# Patient Record
Sex: FEMALE | Race: WHITE | Employment: FULL TIME | URBAN - METROPOLITAN AREA
[De-identification: names, ages, dates, MRNs, and addresses within clinical notes are randomized per-mention and may not be internally consistent; named-entity substitution may affect disease eponyms.]

---

## 2017-08-11 PROBLEM — G43.019 INTRACTABLE MIGRAINE WITHOUT AURA AND WITHOUT STATUS MIGRAINOSUS: Status: ACTIVE | Noted: 2017-08-11

## 2018-11-14 ENCOUNTER — APPOINTMENT (OUTPATIENT)
Dept: ULTRASOUND IMAGING | Age: 28
End: 2018-11-14
Attending: EMERGENCY MEDICINE
Payer: MEDICARE

## 2018-11-14 ENCOUNTER — HOSPITAL ENCOUNTER (EMERGENCY)
Age: 28
Discharge: HOME | End: 2018-11-14
Attending: EMERGENCY MEDICINE
Payer: MEDICARE

## 2018-11-14 ENCOUNTER — APPOINTMENT (OUTPATIENT)
Dept: GENERAL RADIOLOGY | Age: 28
End: 2018-11-14
Attending: EMERGENCY MEDICINE
Payer: MEDICARE

## 2018-11-14 VITALS
BODY MASS INDEX: 25.61 KG/M2 | WEIGHT: 150 LBS | OXYGEN SATURATION: 99 % | SYSTOLIC BLOOD PRESSURE: 116 MMHG | HEART RATE: 82 BPM | TEMPERATURE: 98.4 F | DIASTOLIC BLOOD PRESSURE: 82 MMHG | HEIGHT: 64 IN | RESPIRATION RATE: 16 BRPM

## 2018-11-14 DIAGNOSIS — R10.31 ABDOMINAL PAIN, RIGHT LOWER QUADRANT: Primary | ICD-10-CM

## 2018-11-14 DIAGNOSIS — N83.201 CYST OF RIGHT OVARY: ICD-10-CM

## 2018-11-14 LAB
ALBUMIN SERPL-MCNC: 3.8 G/DL (ref 3.5–5)
ALBUMIN/GLOB SERPL: 1.1 {RATIO} (ref 1.2–3.5)
ALP SERPL-CCNC: 64 U/L (ref 50–136)
ALT SERPL-CCNC: 24 U/L (ref 12–65)
ANION GAP SERPL CALC-SCNC: 7 MMOL/L (ref 7–16)
AST SERPL-CCNC: 12 U/L (ref 15–37)
BASOPHILS # BLD: 0.1 K/UL (ref 0–0.2)
BASOPHILS NFR BLD: 1 % (ref 0–2)
BILIRUB SERPL-MCNC: 0.6 MG/DL (ref 0.2–1.1)
BUN SERPL-MCNC: 7 MG/DL (ref 6–23)
CALCIUM SERPL-MCNC: 8.5 MG/DL (ref 8.3–10.4)
CHLORIDE SERPL-SCNC: 110 MMOL/L (ref 98–107)
CO2 SERPL-SCNC: 24 MMOL/L (ref 21–32)
CREAT SERPL-MCNC: 0.87 MG/DL (ref 0.6–1)
DIFFERENTIAL METHOD BLD: NORMAL
EOSINOPHIL # BLD: 0.1 K/UL (ref 0–0.8)
EOSINOPHIL NFR BLD: 1 % (ref 0.5–7.8)
ERYTHROCYTE [DISTWIDTH] IN BLOOD BY AUTOMATED COUNT: 12.3 %
GLOBULIN SER CALC-MCNC: 3.5 G/DL (ref 2.3–3.5)
GLUCOSE SERPL-MCNC: 96 MG/DL (ref 65–100)
HCG UR QL: NEGATIVE
HCT VFR BLD AUTO: 40.8 % (ref 35.8–46.3)
HGB BLD-MCNC: 13.4 G/DL (ref 11.7–15.4)
IMM GRANULOCYTES # BLD: 0 K/UL (ref 0–0.5)
IMM GRANULOCYTES NFR BLD AUTO: 0 % (ref 0–5)
LYMPHOCYTES # BLD: 2.7 K/UL (ref 0.5–4.6)
LYMPHOCYTES NFR BLD: 33 % (ref 13–44)
MCH RBC QN AUTO: 29.2 PG (ref 26.1–32.9)
MCHC RBC AUTO-ENTMCNC: 32.8 G/DL (ref 31.4–35)
MCV RBC AUTO: 88.9 FL (ref 79.6–97.8)
MONOCYTES # BLD: 0.5 K/UL (ref 0.1–1.3)
MONOCYTES NFR BLD: 7 % (ref 4–12)
NEUTS SEG # BLD: 4.6 K/UL (ref 1.7–8.2)
NEUTS SEG NFR BLD: 58 % (ref 43–78)
NRBC # BLD: 0 K/UL (ref 0–0.2)
PLATELET # BLD AUTO: 256 K/UL (ref 150–450)
PMV BLD AUTO: 10.5 FL (ref 9.4–12.3)
POTASSIUM SERPL-SCNC: 3.7 MMOL/L (ref 3.5–5.1)
PROT SERPL-MCNC: 7.3 G/DL (ref 6.3–8.2)
RBC # BLD AUTO: 4.59 M/UL (ref 4.05–5.2)
SODIUM SERPL-SCNC: 141 MMOL/L (ref 136–145)
WBC # BLD AUTO: 8 K/UL (ref 4.3–11.1)

## 2018-11-14 PROCEDURE — 81025 URINE PREGNANCY TEST: CPT

## 2018-11-14 PROCEDURE — 74011250636 HC RX REV CODE- 250/636: Performed by: EMERGENCY MEDICINE

## 2018-11-14 PROCEDURE — 96374 THER/PROPH/DIAG INJ IV PUSH: CPT | Performed by: EMERGENCY MEDICINE

## 2018-11-14 PROCEDURE — 96375 TX/PRO/DX INJ NEW DRUG ADDON: CPT | Performed by: EMERGENCY MEDICINE

## 2018-11-14 PROCEDURE — 85025 COMPLETE CBC W/AUTO DIFF WBC: CPT

## 2018-11-14 PROCEDURE — 81003 URINALYSIS AUTO W/O SCOPE: CPT | Performed by: EMERGENCY MEDICINE

## 2018-11-14 PROCEDURE — 74022 RADEX COMPL AQT ABD SERIES: CPT

## 2018-11-14 PROCEDURE — 76856 US EXAM PELVIC COMPLETE: CPT

## 2018-11-14 PROCEDURE — 80053 COMPREHEN METABOLIC PANEL: CPT

## 2018-11-14 PROCEDURE — 99284 EMERGENCY DEPT VISIT MOD MDM: CPT | Performed by: EMERGENCY MEDICINE

## 2018-11-14 RX ORDER — DICYCLOMINE HYDROCHLORIDE 20 MG/1
20 TABLET ORAL
Status: DISCONTINUED | OUTPATIENT
Start: 2018-11-14 | End: 2018-11-14

## 2018-11-14 RX ORDER — OXYCODONE AND ACETAMINOPHEN 5; 325 MG/1; MG/1
1 TABLET ORAL
Qty: 5 TAB | Refills: 0 | Status: SHIPPED | OUTPATIENT
Start: 2018-11-14 | End: 2019-01-14

## 2018-11-14 RX ORDER — ONDANSETRON 2 MG/ML
4 INJECTION INTRAMUSCULAR; INTRAVENOUS ONCE
Status: DISCONTINUED | OUTPATIENT
Start: 2018-11-14 | End: 2018-11-14 | Stop reason: HOSPADM

## 2018-11-14 RX ORDER — MORPHINE SULFATE 2 MG/ML
4 INJECTION, SOLUTION INTRAMUSCULAR; INTRAVENOUS ONCE
Status: COMPLETED | OUTPATIENT
Start: 2018-11-14 | End: 2018-11-14

## 2018-11-14 RX ORDER — DICYCLOMINE HYDROCHLORIDE 20 MG/1
20 TABLET ORAL ONCE
Status: DISCONTINUED | OUTPATIENT
Start: 2018-11-14 | End: 2018-11-14 | Stop reason: HOSPADM

## 2018-11-14 RX ORDER — KETOROLAC TROMETHAMINE 30 MG/ML
30 INJECTION, SOLUTION INTRAMUSCULAR; INTRAVENOUS
Status: COMPLETED | OUTPATIENT
Start: 2018-11-14 | End: 2018-11-14

## 2018-11-14 RX ORDER — PROCHLORPERAZINE EDISYLATE 5 MG/ML
10 INJECTION INTRAMUSCULAR; INTRAVENOUS
Status: COMPLETED | OUTPATIENT
Start: 2018-11-14 | End: 2018-11-14

## 2018-11-14 RX ORDER — KETOROLAC TROMETHAMINE 10 MG/1
10 TABLET, FILM COATED ORAL
Qty: 15 TAB | Refills: 0 | Status: SHIPPED | OUTPATIENT
Start: 2018-11-14 | End: 2018-11-20

## 2018-11-14 RX ORDER — ONDANSETRON 2 MG/ML
4 INJECTION INTRAMUSCULAR; INTRAVENOUS
Status: COMPLETED | OUTPATIENT
Start: 2018-11-14 | End: 2018-11-14

## 2018-11-14 RX ADMIN — KETOROLAC TROMETHAMINE 30 MG: 30 INJECTION, SOLUTION INTRAMUSCULAR at 05:58

## 2018-11-14 RX ADMIN — MORPHINE SULFATE 4 MG: 2 INJECTION, SOLUTION INTRAMUSCULAR; INTRAVENOUS at 05:58

## 2018-11-14 RX ADMIN — PROCHLORPERAZINE EDISYLATE 10 MG: 5 INJECTION INTRAMUSCULAR; INTRAVENOUS at 08:04

## 2018-11-14 RX ADMIN — ONDANSETRON 4 MG: 2 INJECTION, SOLUTION INTRAMUSCULAR; INTRAVENOUS at 05:58

## 2018-11-14 NOTE — ED NOTES
Received report as patient was pending pelvic ultrasound. Pelvic ultrasound is also significant for small right ovarian cyst.  We'll discharge home. Encouraged close follow-up with GYN.

## 2018-11-14 NOTE — ED PROVIDER NOTES
The history is provided by the patient. Abdominal Pain This is a recurrent problem. The current episode started yesterday. The problem occurs constantly. The problem has not changed since onset. The pain is associated with vomiting. The pain is located in the RLQ. The quality of the pain is sharp and shooting. The pain is at a severity of 8/10. The pain is severe. Associated symptoms include nausea and vomiting. Pertinent negatives include no anorexia, no fever, no belching, no diarrhea, no flatus, no hematochezia, no melena, no constipation, no dysuria, no frequency, no hematuria, no headaches, no arthralgias, no myalgias, no trauma, no chest pain, no testicular pain and no back pain. The pain is worsened by activity. The pain is relieved by nothing. Past workup includes CT scan. Her past medical history does not include PUD, gallstones, GERD, ulcerative colitis, Crohn's disease, irritable bowel syndrome, cancer, UTI, pancreatitis, ectopic pregnancy, ovarian cysts, diverticulitis, atrial fibrillation, DM, kidney stones or small bowel obstruction. Past Medical History:  
Diagnosis Date  Abdominal pain  AIP (acute intermittent porphyria) ([facility] [address] dx: 2006 (age 16)  
 last tx with Panhematin: Dec 2013  Cancer ([address]   
 acute intermittent porphyria  Diarrhea  Ear infection  Easy bruisability  Endometriosis  Frequent common colds  H/O seasonal allergies  Headache   
 Heavy menses  History of UTI  Irregular heart beat  Kidney stones 2017  Nausea & vomiting  Other ill-defined conditions(799.89)   
 pt states allergy to Port-a-Cath  
 Ovarian cyst 2004  Poor sleep  Poor sleep  Ringing in ears  Seizure ([address]   
 most recent 3/20/16 - due to recent AIP attack Past Surgical History:  
Procedure Laterality Date  HX HYSTERECTOMY Rt ovary intact  HX LAP CHOLECYSTECTOMY  2005  HX OTHER SURGICAL  Oct 2015 (removal) port placement and removal  
 HX OVARIAN CYST REMOVAL  2004 Family History:  
Problem Relation Age of Onset  Diabetes Father  Hypertension Father  Heart Attack Father  Cancer Sister   
     cervical cancer  Other Sister Aneurysm  Ovarian Cancer Sister  Diabetes Mother  Hypertension Mother  Cancer Mother   
     cervical cancer and kidney cancer  Diabetes Sister  Diabetes Brother  Ovarian Cancer Sister   
     half sister Social History Socioeconomic History  Marital status: SINGLE Spouse name: Not on file  Number of children: Not on file  Years of education: Not on file  Highest education level: Not on file Social Needs  Financial resource strain: Not on file  Food insecurity - worry: Not on file  Food insecurity - inability: Not on file  Transportation needs - medical: Not on file  Transportation needs - non-medical: Not on file Occupational History  Not on file Tobacco Use  Smoking status: Never Smoker  Smokeless tobacco: Never Used Substance and Sexual Activity  Alcohol use: No  
  Alcohol/week: 0.0 oz  Drug use: Yes Types: Marijuana Comment: marijuana ~12 joints/day  Sexual activity: Yes  
  Partners: Male Birth control/protection: Surgical  
Other Topics Concern [address] Service Not Asked  Blood Transfusions Not Asked  Caffeine Concern Yes  Occupational Exposure Not Asked [place] Hazards Not Asked  Sleep Concern Not Asked  Stress Concern Not Asked  Weight Concern Not Asked  Special Diet Not Asked  Back Care Not Asked  Exercise Yes  Bike Helmet Not Asked [address] Yes  Self-Exams Yes Social History Narrative Denies any sexual or physical abuse and feels safe at home. ALLERGIES: Amoxicillin; Hydrocodone-acetaminophen; Ciprofloxacin; Iodine; Other medication; Sulfa (sulfonamide antibiotics); and Tape [adhesive] Review of Systems Constitutional: Negative for fever. Cardiovascular: Negative for chest pain. Gastrointestinal: Positive for abdominal pain, nausea and vomiting. Negative for anorexia, constipation, diarrhea, flatus, hematochezia and melena. Genitourinary: Negative for dysuria, frequency, hematuria and testicular pain. Musculoskeletal: Negative for arthralgias, back pain and myalgias. Neurological: Negative for headaches. Vitals:  
 11/14/18 0527 11/14/18 0901 BP: 114/56 116/82 Pulse: 88 82 Resp: 16 16 Temp: 98 °F (36.7 °C) 98.4 °F (36.9 °C) SpO2: 99% 99% Weight: 68 kg (150 lb) Height: 5' 4\" (1.626 m) Physical Exam  
 
MDM Number of Diagnoses or Management Options Abdominal pain, right lower quadrant:  
Cyst of right ovary:  
Diagnosis management comments: 21-year-old female presenting for right lower quadrant pain. Differential includes constipation, ovarian cyst, diverticulitis, appendicitis Amount and/or Complexity of Data Reviewed Clinical lab tests: ordered and reviewed Tests in the radiology section of CPT®: ordered and reviewed Tests in the medicine section of CPT®: ordered and reviewed Risk of Complications, Morbidity, and/or Mortality Presenting problems: moderate Diagnostic procedures: moderate Management options: moderate Patient Progress Patient progress: improved ED Course as of Nov 19 1405 Wed Nov 14, 2018  
8512 I reviewed the patient's prescription monitoring database and there is only a couple prescriptions over the past year. [JS] ED Course User Index [JS] Julienne Gaspar MD  
 
 
Procedures

## 2018-11-14 NOTE — LETTER
[address] [facility] EMERGENCY DEPT [address] [id number] [phone] Work/School Note Date: 11/14/2018 To Whom It May concern: 
 
Nick Mdasen was seen and treated today in the emergency room by the following provider(s): 
Attending Provider: Art Horvath MD.   
 
Nick Madsen may return to work on 11/18/18. Sincerely, Gracie Gillespie RN

## 2018-11-14 NOTE — ED TRIAGE NOTES
Pt c/o right sided lower abd pain, sharp, shooting, stabbing pain. Pt states this pain started yesterday and pt went to [facility] where the ER physician told her it was her endometreosis, she does have a known cyst on the right ovary. Pt has n/v/d, denies fever/chills.

## 2018-11-14 NOTE — ED NOTES
I have reviewed discharge instructions with the patient. The patient verbalized understanding. Patient left ED via Discharge Method: ambulatory to Home with family. Opportunity for questions and clarification provided. Patient given 1 scripts. To continue your aftercare when you leave the hospital, you may receive an automated call from our care team to check in on how you are doing. This is a free service and part of our promise to provide the best care and service to meet your aftercare needs.  If you have questions, or wish to unsubscribe from this service please call [phone]. Thank you for Choosing our [facility] Emergency Department.

## 2018-11-14 NOTE — DISCHARGE INSTRUCTIONS
Take medications as prescribed  Follow-up with gynecology  Return to the ER for any new or worsening symptoms      Functional Ovarian Cyst: Care Instructions  Your Care Instructions    A functional ovarian cyst is a sac that forms on the surface of a woman's ovary during ovulation. The sac holds a maturing egg. Usually the sac goes away after the egg is released. But if the egg is not released, or if the sac closes up after the egg is released, the sac can swell up with fluid and form a cyst.  Functional ovarian cysts are different than ovarian growths caused by other problems, such as cancer. Most functional ovarian cysts cause no symptoms and go away on their own. Some cause mild pain. Others can cause severe pain when they rupture or bleed. Follow-up care is a key part of your treatment and safety. Be sure to make and go to all appointments, and call your doctor if you are having problems. It's also a good idea to know your test results and keep a list of the medicines you take. How can you care for yourself at home? · Use heat, such as a hot water bottle, a heating pad set on low, or a warm bath, to relax tense muscles and relieve cramping. · Be safe with medicines. Take pain medicines exactly as directed. ? If the doctor gave you a prescription medicine for pain, take it as prescribed. ? If you are not taking a prescription pain medicine, ask your doctor if you can take an over-the-counter medicine. · Avoid constipation. Make sure you drink enough fluids and include fruits, vegetables, and fiber in your diet each day. Constipation does not cause ovarian cysts, but it may make your pelvic pain worse. When should you call for help?   Call your doctor now or seek immediate medical care if:    · You have severe vaginal bleeding.     · You have new or worse belly or pelvic pain.    Watch closely for changes in your health, and be sure to contact your doctor if:    · You have unusual vaginal bleeding.     · You do not get better as expected. Where can you learn more? Go to http://félix-audra.info/. Enter R133 in the search box to learn more about \"Functional Ovarian Cyst: Care Instructions. \"  Current as of: May 15, 2018  Content Version: 11.8  © 7787-2959 [employer], [employer]. Care instructions adapted under license by [employer] (which disclaims liability or warranty for this information). If you have questions about a medical condition or this instruction, always ask your healthcare professional. [address] any warranty or liability for your use of this information.

## 2019-01-04 ENCOUNTER — HOSPITAL ENCOUNTER (OUTPATIENT)
Dept: SURGERY | Age: 29
Discharge: HOME | End: 2019-01-04

## 2019-01-07 VITALS — BODY MASS INDEX: 25.61 KG/M2 | WEIGHT: 150 LBS | HEIGHT: 64 IN

## 2019-01-07 NOTE — PERIOP NOTES
Patient verified name and . Order for consent not found in EHR and matches case posting; patient verifies procedure. Type II surgery, phone assessment complete. Orders not received. Labs per surgeon: none  Labs per anesthesia protocol: hgb (DOS). Recent lab results in EMR for reference, no hx anemia. Patient answered medical/surgical history questions at their best of ability. All prior to admission medications documented in [facility]. Patient instructed to take the following medications the day of surgery according to anesthesia guidelines with a small sip of water: none . Hold all vitamins 7 days prior to surgery and NSAIDS 5 days prior to surgery. Medications to be held none    Patient instructed on the following:  Arrive at A Entrance, time of arrival to be called the day before by 1700  NPO after midnight including gum, mints, and ice chips  Responsible adult must drive patient to the hospital, stay during surgery, and patient will  need supervision 24 hours after anesthesia  Use dial in shower the night before surgery and on the morning of surgery  All piercings must be removed prior to arrival.    Leave all valuables (money and jewelry) at home but bring insurance card and ID on DOS. Do not wear make-up, nail polish, lotions, cologne, perfumes, powders, or oil on skin. Patient teach back successful and patient demonstrates knowledge of instruction.

## 2019-01-10 ENCOUNTER — ANESTHESIA EVENT (OUTPATIENT)
Dept: SURGERY | Age: 29
End: 2019-01-10
Payer: COMMERCIAL

## 2019-01-10 RX ORDER — MIDAZOLAM HYDROCHLORIDE 1 MG/ML
2 INJECTION, SOLUTION INTRAMUSCULAR; INTRAVENOUS ONCE
Status: CANCELLED | OUTPATIENT
Start: 2019-01-10 | End: 2019-01-10

## 2019-01-10 RX ORDER — FENTANYL CITRATE 50 UG/ML
100 INJECTION, SOLUTION INTRAMUSCULAR; INTRAVENOUS ONCE
Status: CANCELLED | OUTPATIENT
Start: 2019-01-10 | End: 2019-01-10

## 2019-01-11 ENCOUNTER — ANESTHESIA (OUTPATIENT)
Dept: SURGERY | Age: 29
End: 2019-01-11
Payer: COMMERCIAL

## 2019-01-11 ENCOUNTER — HOSPITAL ENCOUNTER (OUTPATIENT)
Age: 29
Setting detail: OUTPATIENT SURGERY
Discharge: HOME | End: 2019-01-11
Attending: OBSTETRICS & GYNECOLOGY | Admitting: OBSTETRICS & GYNECOLOGY
Payer: COMMERCIAL

## 2019-01-11 VITALS
RESPIRATION RATE: 16 BRPM | OXYGEN SATURATION: 98 % | BODY MASS INDEX: 26.55 KG/M2 | HEIGHT: 64 IN | TEMPERATURE: 97.6 F | DIASTOLIC BLOOD PRESSURE: 62 MMHG | HEART RATE: 81 BPM | SYSTOLIC BLOOD PRESSURE: 115 MMHG | WEIGHT: 155.5 LBS

## 2019-01-11 DIAGNOSIS — G89.18 POSTOPERATIVE PAIN: Primary | ICD-10-CM

## 2019-01-11 PROCEDURE — 74011000250 HC RX REV CODE- 250: Performed by: OBSTETRICS & GYNECOLOGY

## 2019-01-11 PROCEDURE — 77030035029 HC NDL INSUF VERES DISP COVD -B: Performed by: OBSTETRICS & GYNECOLOGY

## 2019-01-11 PROCEDURE — 74011000250 HC RX REV CODE- 250

## 2019-01-11 PROCEDURE — 77030035051: Performed by: OBSTETRICS & GYNECOLOGY

## 2019-01-11 PROCEDURE — 74011250636 HC RX REV CODE- 250/636

## 2019-01-11 PROCEDURE — 77030034849: Performed by: OBSTETRICS & GYNECOLOGY

## 2019-01-11 PROCEDURE — 77030009852 HC PCH RTVR ENDOSC COVD -B: Performed by: OBSTETRICS & GYNECOLOGY

## 2019-01-11 PROCEDURE — 76010000160 HC OR TIME 0.5 TO 1 HR INTENSV-TIER 1: Performed by: OBSTETRICS & GYNECOLOGY

## 2019-01-11 PROCEDURE — 77030035048 HC TRCR ENDOSC OPTCL COVD -B: Performed by: OBSTETRICS & GYNECOLOGY

## 2019-01-11 PROCEDURE — 76210000016 HC OR PH I REC 1 TO 1.5 HR: Performed by: OBSTETRICS & GYNECOLOGY

## 2019-01-11 PROCEDURE — 77030026093 HC FCPS ENDOSC PLSM OCOA -E: Performed by: OBSTETRICS & GYNECOLOGY

## 2019-01-11 PROCEDURE — 74011250637 HC RX REV CODE- 250/637: Performed by: ANESTHESIOLOGY

## 2019-01-11 PROCEDURE — 74011250636 HC RX REV CODE- 250/636: Performed by: ANESTHESIOLOGY

## 2019-01-11 PROCEDURE — 77030032490 HC SLV COMPR SCD KNE COVD -B: Performed by: OBSTETRICS & GYNECOLOGY

## 2019-01-11 PROCEDURE — 77030037088 HC TUBE ENDOTRACH ORAL NSL COVD-A: Performed by: ANESTHESIOLOGY

## 2019-01-11 PROCEDURE — 77030008522 HC TBNG INSUF LAPRO STRY -B: Performed by: OBSTETRICS & GYNECOLOGY

## 2019-01-11 PROCEDURE — 77030031139 HC SUT VCRL2 J&J -A: Performed by: OBSTETRICS & GYNECOLOGY

## 2019-01-11 PROCEDURE — 74011000250 HC RX REV CODE- 250: Performed by: ANESTHESIOLOGY

## 2019-01-11 PROCEDURE — 76060000033 HC ANESTHESIA 1 TO 1.5 HR: Performed by: OBSTETRICS & GYNECOLOGY

## 2019-01-11 PROCEDURE — 76210000020 HC REC RM PH II FIRST 0.5 HR: Performed by: OBSTETRICS & GYNECOLOGY

## 2019-01-11 PROCEDURE — 77030018836 HC SOL IRR NACL ICUM -A: Performed by: OBSTETRICS & GYNECOLOGY

## 2019-01-11 PROCEDURE — 88305 TISSUE EXAM BY PATHOLOGIST: CPT

## 2019-01-11 PROCEDURE — 77030035044 HC TRCR ENDOSC VRSPRT BLDLSS COVD -C: Performed by: OBSTETRICS & GYNECOLOGY

## 2019-01-11 PROCEDURE — 77030020782 HC GWN BAIR PAWS FLX 3M -B: Performed by: ANESTHESIOLOGY

## 2019-01-11 PROCEDURE — 77030035045 HC TRCR ENDOSC VRSPRT BLDLSS COVD -B: Performed by: OBSTETRICS & GYNECOLOGY

## 2019-01-11 PROCEDURE — 77030039425 HC BLD LARYNG TRULITE DISP TELE -A: Performed by: ANESTHESIOLOGY

## 2019-01-11 RX ORDER — LIDOCAINE HYDROCHLORIDE 10 MG/ML
0.1 INJECTION INFILTRATION; PERINEURAL AS NEEDED
Status: DISCONTINUED | OUTPATIENT
Start: 2019-01-11 | End: 2019-01-11 | Stop reason: HOSPADM

## 2019-01-11 RX ORDER — ESMOLOL HYDROCHLORIDE 10 MG/ML
INJECTION, SOLUTION INTRAVENOUS
Status: DISCONTINUED | OUTPATIENT
Start: 2019-01-11 | End: 2019-01-11

## 2019-01-11 RX ORDER — EPHEDRINE SULFATE 50 MG/ML
INJECTION, SOLUTION INTRAVENOUS AS NEEDED
Status: DISCONTINUED | OUTPATIENT
Start: 2019-01-11 | End: 2019-01-11 | Stop reason: HOSPADM

## 2019-01-11 RX ORDER — MIDAZOLAM HYDROCHLORIDE 1 MG/ML
2 INJECTION, SOLUTION INTRAMUSCULAR; INTRAVENOUS ONCE
Status: COMPLETED | OUTPATIENT
Start: 2019-01-11 | End: 2019-01-11

## 2019-01-11 RX ORDER — ROCURONIUM BROMIDE 10 MG/ML
INJECTION, SOLUTION INTRAVENOUS AS NEEDED
Status: DISCONTINUED | OUTPATIENT
Start: 2019-01-11 | End: 2019-01-11 | Stop reason: HOSPADM

## 2019-01-11 RX ORDER — NEOSTIGMINE METHYLSULFATE 1 MG/ML
INJECTION INTRAVENOUS AS NEEDED
Status: DISCONTINUED | OUTPATIENT
Start: 2019-01-11 | End: 2019-01-11 | Stop reason: HOSPADM

## 2019-01-11 RX ORDER — HYDROMORPHONE HYDROCHLORIDE 2 MG/1
2 TABLET ORAL
Qty: 20 TAB | Refills: 0 | Status: SHIPPED | OUTPATIENT
Start: 2019-01-11 | End: 2019-01-30

## 2019-01-11 RX ORDER — FAMOTIDINE 20 MG/1
20 TABLET, FILM COATED ORAL ONCE
Status: COMPLETED | OUTPATIENT
Start: 2019-01-11 | End: 2019-01-11

## 2019-01-11 RX ORDER — OXYCODONE HYDROCHLORIDE 5 MG/1
10 TABLET ORAL
Status: DISCONTINUED | OUTPATIENT
Start: 2019-01-11 | End: 2019-01-11 | Stop reason: HOSPADM

## 2019-01-11 RX ORDER — KETOROLAC TROMETHAMINE 30 MG/ML
INJECTION, SOLUTION INTRAMUSCULAR; INTRAVENOUS AS NEEDED
Status: DISCONTINUED | OUTPATIENT
Start: 2019-01-11 | End: 2019-01-11 | Stop reason: HOSPADM

## 2019-01-11 RX ORDER — LIDOCAINE HYDROCHLORIDE 20 MG/ML
INJECTION, SOLUTION EPIDURAL; INFILTRATION; INTRACAUDAL; PERINEURAL AS NEEDED
Status: DISCONTINUED | OUTPATIENT
Start: 2019-01-11 | End: 2019-01-11 | Stop reason: HOSPADM

## 2019-01-11 RX ORDER — OXYCODONE HYDROCHLORIDE 5 MG/1
5 TABLET ORAL
Status: DISCONTINUED | OUTPATIENT
Start: 2019-01-11 | End: 2019-01-11 | Stop reason: HOSPADM

## 2019-01-11 RX ORDER — SODIUM CHLORIDE, SODIUM LACTATE, POTASSIUM CHLORIDE, CALCIUM CHLORIDE 600; 310; 30; 20 MG/100ML; MG/100ML; MG/100ML; MG/100ML
75 INJECTION, SOLUTION INTRAVENOUS CONTINUOUS
Status: DISCONTINUED | OUTPATIENT
Start: 2019-01-11 | End: 2019-01-11 | Stop reason: HOSPADM

## 2019-01-11 RX ORDER — ESMOLOL HYDROCHLORIDE 10 MG/ML
INJECTION INTRAVENOUS AS NEEDED
Status: DISCONTINUED | OUTPATIENT
Start: 2019-01-11 | End: 2019-01-11 | Stop reason: HOSPADM

## 2019-01-11 RX ORDER — ESTRADIOL 1 MG/1
1 TABLET ORAL DAILY
Qty: 30 TAB | Refills: 11 | Status: SHIPPED | OUTPATIENT
Start: 2019-01-11 | End: 2020-01-13 | Stop reason: SDUPTHER

## 2019-01-11 RX ORDER — GLYCOPYRROLATE 0.2 MG/ML
INJECTION INTRAMUSCULAR; INTRAVENOUS AS NEEDED
Status: DISCONTINUED | OUTPATIENT
Start: 2019-01-11 | End: 2019-01-11 | Stop reason: HOSPADM

## 2019-01-11 RX ORDER — PROPOFOL 10 MG/ML
INJECTION, EMULSION INTRAVENOUS AS NEEDED
Status: DISCONTINUED | OUTPATIENT
Start: 2019-01-11 | End: 2019-01-11 | Stop reason: HOSPADM

## 2019-01-11 RX ORDER — BUPIVACAINE HYDROCHLORIDE 5 MG/ML
INJECTION, SOLUTION EPIDURAL; INTRACAUDAL AS NEEDED
Status: DISCONTINUED | OUTPATIENT
Start: 2019-01-11 | End: 2019-01-11 | Stop reason: HOSPADM

## 2019-01-11 RX ORDER — FENTANYL CITRATE 50 UG/ML
INJECTION, SOLUTION INTRAMUSCULAR; INTRAVENOUS AS NEEDED
Status: DISCONTINUED | OUTPATIENT
Start: 2019-01-11 | End: 2019-01-11 | Stop reason: HOSPADM

## 2019-01-11 RX ORDER — HYDROMORPHONE HYDROCHLORIDE 2 MG/ML
0.5 INJECTION, SOLUTION INTRAMUSCULAR; INTRAVENOUS; SUBCUTANEOUS
Status: DISCONTINUED | OUTPATIENT
Start: 2019-01-11 | End: 2019-01-11 | Stop reason: HOSPADM

## 2019-01-11 RX ORDER — ONDANSETRON 2 MG/ML
INJECTION INTRAMUSCULAR; INTRAVENOUS AS NEEDED
Status: DISCONTINUED | OUTPATIENT
Start: 2019-01-11 | End: 2019-01-11 | Stop reason: HOSPADM

## 2019-01-11 RX ORDER — APREPITANT 40 MG/1
40 CAPSULE ORAL ONCE
Status: COMPLETED | OUTPATIENT
Start: 2019-01-11 | End: 2019-01-11

## 2019-01-11 RX ADMIN — MIDAZOLAM 2 MG: 1 INJECTION INTRAMUSCULAR; INTRAVENOUS at 10:00

## 2019-01-11 RX ADMIN — FAMOTIDINE 20 MG: 20 TABLET ORAL at 09:52

## 2019-01-11 RX ADMIN — ONDANSETRON 4 MG: 2 INJECTION INTRAMUSCULAR; INTRAVENOUS at 11:17

## 2019-01-11 RX ADMIN — PROPOFOL 200 MG: 10 INJECTION, EMULSION INTRAVENOUS at 10:52

## 2019-01-11 RX ADMIN — ROCURONIUM BROMIDE 30 MG: 10 INJECTION, SOLUTION INTRAVENOUS at 10:52

## 2019-01-11 RX ADMIN — NEOSTIGMINE METHYLSULFATE 1 MG: 1 INJECTION INTRAVENOUS at 11:37

## 2019-01-11 RX ADMIN — SODIUM CHLORIDE, SODIUM LACTATE, POTASSIUM CHLORIDE, AND CALCIUM CHLORIDE 75 ML/HR: 600; 310; 30; 20 INJECTION, SOLUTION INTRAVENOUS at 09:53

## 2019-01-11 RX ADMIN — EPHEDRINE SULFATE 10 MG: 50 INJECTION, SOLUTION INTRAVENOUS at 11:05

## 2019-01-11 RX ADMIN — SODIUM CHLORIDE, SODIUM LACTATE, POTASSIUM CHLORIDE, AND CALCIUM CHLORIDE: 600; 310; 30; 20 INJECTION, SOLUTION INTRAVENOUS at 11:46

## 2019-01-11 RX ADMIN — FENTANYL CITRATE 50 MCG: 50 INJECTION, SOLUTION INTRAMUSCULAR; INTRAVENOUS at 11:14

## 2019-01-11 RX ADMIN — LIDOCAINE HYDROCHLORIDE 0.1 ML: 10 INJECTION, SOLUTION INFILTRATION; PERINEURAL at 09:53

## 2019-01-11 RX ADMIN — APREPITANT 40 MG: 40 CAPSULE ORAL at 09:52

## 2019-01-11 RX ADMIN — HYDROMORPHONE HYDROCHLORIDE 0.5 MG: 2 INJECTION, SOLUTION INTRAMUSCULAR; INTRAVENOUS; SUBCUTANEOUS at 12:19

## 2019-01-11 RX ADMIN — HYDROMORPHONE HYDROCHLORIDE 0.5 MG: 2 INJECTION, SOLUTION INTRAMUSCULAR; INTRAVENOUS; SUBCUTANEOUS at 12:00

## 2019-01-11 RX ADMIN — NEOSTIGMINE METHYLSULFATE 1 MG: 1 INJECTION INTRAVENOUS at 11:30

## 2019-01-11 RX ADMIN — FENTANYL CITRATE 100 MCG: 50 INJECTION, SOLUTION INTRAMUSCULAR; INTRAVENOUS at 10:52

## 2019-01-11 RX ADMIN — GLYCOPYRROLATE 0.2 MG: 0.2 INJECTION INTRAMUSCULAR; INTRAVENOUS at 11:30

## 2019-01-11 RX ADMIN — GLYCOPYRROLATE 0.2 MG: 0.2 INJECTION INTRAMUSCULAR; INTRAVENOUS at 11:29

## 2019-01-11 RX ADMIN — KETOROLAC TROMETHAMINE 30 MG: 30 INJECTION, SOLUTION INTRAMUSCULAR; INTRAVENOUS at 11:20

## 2019-01-11 RX ADMIN — NEOSTIGMINE METHYLSULFATE 1 MG: 1 INJECTION INTRAVENOUS at 11:29

## 2019-01-11 RX ADMIN — ESMOLOL HYDROCHLORIDE 10 MG: 10 INJECTION INTRAVENOUS at 11:30

## 2019-01-11 RX ADMIN — LIDOCAINE HYDROCHLORIDE 100 MG: 20 INJECTION, SOLUTION EPIDURAL; INFILTRATION; INTRACAUDAL; PERINEURAL at 10:52

## 2019-01-11 RX ADMIN — PROMETHAZINE HYDROCHLORIDE 3.25 MG: 25 INJECTION INTRAMUSCULAR; INTRAVENOUS at 12:19

## 2019-01-11 NOTE — ANESTHESIA POSTPROCEDURE EVALUATION
Procedure(s): RIGHT SALPINGO-OOPHORECTOMY LAPAROSCOPIC. Anesthesia Post Evaluation Multimodal analgesia: multimodal analgesia not used between 6 hours prior to anesthesia start to PACU discharge Patient location during evaluation: PACU Patient participation: complete - patient participated Level of consciousness: awake and alert Pain management: adequate Airway patency: patent Anesthetic complications: no 
Cardiovascular status: hemodynamically stable Respiratory status: acceptable Hydration status: acceptable Visit Vitals /62 Pulse 81 Temp 36.4 °C (97.6 °F) Resp 16 Ht 5' 4\" (1.626 m) Wt 70.5 kg (155 lb 8 oz) SpO2 98% BMI 26.69 kg/m²

## 2019-01-11 NOTE — DISCHARGE INSTRUCTIONS
INSTRUCTIONS FOLLOWING GYN LAPAROSCOPY      ACTIVITY   Limit activity today; increase activity tomorrow, but no vigorous exercise   Shower only; no tub baths   No douches, tampons or intercourse until your doctor releases you (at least 2 weeks)   May return to work or school as directed by your doctor    DIET   Clear liquids until no nausea or vomiting   Advance to regular diet as tolerated    PAIN   Expect a moderate amount of pain.  Take pain medication as directed by your doctor. If no prescription for pain is sent home with you, take the appropriate dose of your commonly used pain medication.  Call you doctor if pain is NOT relieved by medication.  DO NOT take aspirin or blood thinners until directed by your doctor. DRESSING CARE *** Does Not Apply   Change dressing / band aids as directed by your doctor. FOLLOW PHONE [address] will be made by nursing staff.  If you have any problems or concerns, call your doctor as needed. CALL YOUR DOCTOR IF   Excessive bleeding that does not stop after holding mild pressure over the area for 15 minutes   You soak a pad in an hour   Temperature of 101°F or above   Green or yellow, smelly drainage or discharge   You are unable to urinate by bedtime   Nausea and vomiting that does not stop by bedtime    AFTER ANESTHESIA   For the next 24 hours: DO NOT Drive, Drink alcoholic beverages, or Make important decisions.  Be aware of dizziness following anesthesia and while taking pain medication.    Plan to stay tonight within 1 hours drive of the hospital.

## 2019-01-11 NOTE — ANESTHESIA PREPROCEDURE EVALUATION
Anesthetic History PONV Review of Systems / Medical History Patient summary reviewed Pulmonary Neuro/Psych Cardiovascular Exercise tolerance: >4 METS 
  
GI/Hepatic/Renal 
  
 
 
 
 
 
 Endo/Other Other Findings Comments: Acute intermittent porphyria No decadron Physical Exam 
 
Airway Mallampati: II 
TM Distance: > 6 cm Neck ROM: normal range of motion Mouth opening: Normal 
 
 Cardiovascular Regular rate and rhythm,  S1 and S2 normal,  no murmur, click, rub, or gallop Dental 
No notable dental hx Pulmonary Breath sounds clear to auscultation Abdominal 
 
 
 
 Other Findings Anesthetic Plan ASA: 2 Anesthesia type: general 
 
 
 
 
 
Anesthetic plan and risks discussed with: Patient

## 2019-01-11 NOTE — OP NOTES
[address] [facility]  OPERATIVE REPORT    Eliana Horner  MR#: [id number]  : 1990  ACCOUNT #: [de-identified]   DATE OF SERVICE: 2019    PREOPERATIVE DIAGNOSES:  Pelvic pain, right ovarian cyst, endometriosis. POSTOPERATIVE DIAGNOSES:  Pelvic pain, right ovarian cyst, pelvic adhesions. PROCEDURE PERFORMED:  Laparoscopic right oophorectomy. SURGEON:  Maria Antonia Leal. Fan Dillon MD    ASSISTANT:  None. ANESTHESIA:  General.    ESTIMATED BLOOD LOSS:  Minimal.    SPECIMENS REMOVED:  Right ovary. FINDINGS:  Right ovary densely adhered to the right sidewall and cystic. COMPLICATIONS:  None. IMPLANTS:  None. DESCRIPTION OF PROCEDURE:  After an adequate level of anesthesia was obtained, the patient was prepped and draped in the usual sterile fashion. Since she had had previous surgeries, a left upper quadrant incision was made after injecting with Marcaine solution, and a Veress needle inserted, insufflating the peritoneal cavity with CO2. Trocar was placed under visualization and noted to be within the peritoneal cavity with visualization. The infraumbilical incision was made and the 10 trocar was placed there. Inspection of the pelvic cavity showed normal appendix, overall clear pelvis, other than on the right adnexal area the ovary was densely adhered to the sidewall. This was taken down, with care to avoid the ureter, and after this was freed up, the infundibulopelvic ligament was coagulated with the Gyrus device. The ovary was placed in the bag and removed through the larger 10 mm port with no difficulty. There were no other abnormalities to be cleared. The larger trocar was removed. The CO2 was released and the upper quadrant trocar removed. Deep stitch was placed infraumbilically, 4-0 Vicryl subcutaneously. Steri-Strips were placed. The patient was taken to the recovery room in good condition. DISPOSITION:  The patient was discharged home.   She was given a prescription for Dilaudid (#20). She has an appointment in 2 weeks for followup. Any problem before her visit she is to call, otherwise precautions were given.       MD ELLA Iqbal / MAX  D: 01/11/2019 11:49     T: 01/11/2019 12:04  JOB #: [id number]

## 2019-01-11 NOTE — H&P
Subjective:     Patient is a 33 y.o.  female presents with pelvic pain. rapidly worsening course. Patient Active Problem List    Diagnosis Date Noted    Intractable migraine without aura and without status migrainosus 08/11/2017    Endometriosis 05/19/2016    Breakthrough bleeding on birth control pills 02/01/2016    Insomnia 01/28/2016     Past Medical History:   Diagnosis Date    Abdominal pain     AIP (acute intermittent porphyria) ([address] dx: 2006 (age 16)    last tx with Panhematin: Dec 2013. last seen by hematology 2016.  No issues since hysterectomy in 2016    Cancer [facility])     acute intermittent porphyria    Diarrhea     Ear infection     Easy bruisability     Endometriosis     s/p hysterectomy    Frequent common colds     H/O seasonal allergies     Headache     Heavy menses     History of UTI     Irregular heart beat     hx. no murmur    Kidney stones 2017    Nausea & vomiting     Other ill-defined conditions(799.89)     pt states allergy to Port-a-Cath    Ovarian cyst 2004    Poor sleep     Ringing in ears     Seizure ([address]     most recent 3/20/16 - due to recent AIP attack       Past Surgical History:   Procedure Laterality Date    HX HYSTERECTOMY      Rt ovary intact    HX LAP CHOLECYSTECTOMY  2005    HX OTHER SURGICAL  Oct 2015 (removal)    port placement and removal    HX OVARIAN CYST REMOVAL  2004      [unfilled]  Allergies   Allergen Reactions    Amoxicillin Unknown (comments)    Hydrocodone-Acetaminophen Unknown (comments)    Ciprofloxacin Rash     Arm, fingers, stomach    Iodine Rash    Other Medication Contact Dermatitis     Dermabond    Sulfa (Sulfonamide Antibiotics) Rash     fever    Tape [Adhesive] Rash      Social History     Tobacco Use    Smoking status: Never Smoker    Smokeless tobacco: Never Used   Substance Use Topics    Alcohol use: No     Alcohol/week: 0.0 oz      Family History   Problem Relation Age of Onset    Diabetes Father     Hypertension Father     Heart Attack Father     Cancer Sister         cervical cancer    Other Sister         Aneurysm    Ovarian Cancer Sister     Diabetes Mother     Hypertension Mother     Cancer Mother         cervical cancer and kidney cancer    Diabetes Sister     Diabetes Brother     Ovarian Cancer Sister         half sister      Review of Systems  A comprehensive review of systems was negative except for that written in the HPI. Objective:     Patient Vitals for the past 8 hrs:   BP Temp Pulse Resp SpO2 Height Weight   01/11/19 0930 142/72 98.2 °F (36.8 °C) 80 16 99 % 5' 4\" (1.626 m) 155 lb 8 oz (70.5 kg)     No intake or output data in the 24 hours ending 01/11/19 1024      General: Alert . Oriented x3   HEENT: Normocephalic PEERL   Heart: RRR   Lungs: Clear   Abdominal: Bowel sounds are normal, liver is not enlarged, spleen is not enlarged   Neurological: Alert and oriented X 3, normal strength and tone. Normal symmetric reflexes. Normal coordination and gait   Extremities: extremities normal, atraumatic, no cyanosis or edema     Assessment:     Pelvic pain     endometriosis    Plan:       Procedure(s):  RIGHT SALPINGO-OOPHORECTOMY LAPAROSCOPIC     Understansd likely need for ERT after surgery and risks /benefits of this. The procedure was reviewed in detail as well as the risks of bleeding, infection, DVT and potential surgical complications involving the bladder, ureters, colon or intestines. Also the alternatives,  benefits, recovery and follow-up. All of her questions were answered.

## 2019-01-29 ENCOUNTER — HOSPITAL ENCOUNTER (EMERGENCY)
Age: 29
Discharge: HOME | End: 2019-01-29
Attending: EMERGENCY MEDICINE
Payer: COMMERCIAL

## 2019-01-29 VITALS
BODY MASS INDEX: 28.32 KG/M2 | OXYGEN SATURATION: 98 % | DIASTOLIC BLOOD PRESSURE: 59 MMHG | TEMPERATURE: 98.8 F | RESPIRATION RATE: 16 BRPM | WEIGHT: 150 LBS | HEIGHT: 61 IN | SYSTOLIC BLOOD PRESSURE: 116 MMHG | HEART RATE: 88 BPM

## 2019-01-29 DIAGNOSIS — R56.9 SEIZURE-LIKE ACTIVITY (HCC): Primary | ICD-10-CM

## 2019-01-29 LAB
ALBUMIN SERPL-MCNC: 3.6 G/DL (ref 3.5–5)
ALBUMIN/GLOB SERPL: 1 {RATIO}
ALP SERPL-CCNC: 69 U/L (ref 50–130)
ALT SERPL-CCNC: 22 U/L (ref 12–65)
ANION GAP SERPL CALC-SCNC: 9 MMOL/L
AST SERPL-CCNC: 17 U/L (ref 15–37)
BASOPHILS # BLD: 0.1 K/UL (ref 0–0.2)
BASOPHILS NFR BLD: 1 % (ref 0–2)
BILIRUB SERPL-MCNC: 0.4 MG/DL (ref 0.2–1.1)
BUN SERPL-MCNC: 8 MG/DL (ref 6–23)
CALCIUM SERPL-MCNC: 8.7 MG/DL (ref 8.3–10.4)
CHLORIDE SERPL-SCNC: 109 MMOL/L (ref 98–107)
CO2 SERPL-SCNC: 24 MMOL/L (ref 21–32)
CREAT SERPL-MCNC: 0.82 MG/DL (ref 0.6–1)
DIFFERENTIAL METHOD BLD: ABNORMAL
EOSINOPHIL # BLD: 0 K/UL (ref 0–0.8)
EOSINOPHIL NFR BLD: 0 % (ref 0.5–7.8)
ERYTHROCYTE [DISTWIDTH] IN BLOOD BY AUTOMATED COUNT: 12 % (ref 11.9–14.6)
GLOBULIN SER CALC-MCNC: 3.5 G/DL (ref 2.3–3.5)
GLUCOSE SERPL-MCNC: 98 MG/DL (ref 65–100)
HCT VFR BLD AUTO: 38.2 % (ref 35.8–46.3)
HGB BLD-MCNC: 12.6 G/DL (ref 11.7–15.4)
IMM GRANULOCYTES # BLD AUTO: 0 K/UL (ref 0–0.5)
IMM GRANULOCYTES NFR BLD AUTO: 0 % (ref 0–5)
LACTATE BLD-SCNC: 0.7 MMOL/L (ref 0.5–1.9)
LYMPHOCYTES # BLD: 1.9 K/UL (ref 0.5–4.6)
LYMPHOCYTES NFR BLD: 25 % (ref 13–44)
MCH RBC QN AUTO: 28.4 PG (ref 26.1–32.9)
MCHC RBC AUTO-ENTMCNC: 33 G/DL (ref 31.4–35)
MCV RBC AUTO: 86.2 FL (ref 79.6–97.8)
MONOCYTES # BLD: 0.4 K/UL (ref 0.1–1.3)
MONOCYTES NFR BLD: 6 % (ref 4–12)
NEUTS SEG # BLD: 5.4 K/UL (ref 1.7–8.2)
NEUTS SEG NFR BLD: 68 % (ref 43–78)
NRBC # BLD: 0 K/UL (ref 0–0.2)
PLATELET # BLD AUTO: 250 K/UL (ref 150–450)
PMV BLD AUTO: 10.9 FL (ref 9.4–12.3)
POTASSIUM SERPL-SCNC: 3.6 MMOL/L (ref 3.5–5.1)
PROT SERPL-MCNC: 7.1 G/DL
RBC # BLD AUTO: 4.43 M/UL (ref 4.05–5.2)
SODIUM SERPL-SCNC: 142 MMOL/L (ref 136–145)
WBC # BLD AUTO: 7.9 K/UL (ref 4.3–11.1)

## 2019-01-29 PROCEDURE — 96360 HYDRATION IV INFUSION INIT: CPT | Performed by: EMERGENCY MEDICINE

## 2019-01-29 PROCEDURE — 80053 COMPREHEN METABOLIC PANEL: CPT

## 2019-01-29 PROCEDURE — 85025 COMPLETE CBC W/AUTO DIFF WBC: CPT

## 2019-01-29 PROCEDURE — 99284 EMERGENCY DEPT VISIT MOD MDM: CPT | Performed by: EMERGENCY MEDICINE

## 2019-01-29 PROCEDURE — 83605 ASSAY OF LACTIC ACID: CPT

## 2019-01-29 PROCEDURE — 74011250636 HC RX REV CODE- 250/636: Performed by: EMERGENCY MEDICINE

## 2019-01-29 RX ORDER — LORAZEPAM 0.5 MG/1
0.5 TABLET ORAL
Qty: 15 TAB | Refills: 0 | Status: SHIPPED | OUTPATIENT
Start: 2019-01-29 | End: 2019-01-30 | Stop reason: SDUPTHER

## 2019-01-29 RX ADMIN — SODIUM CHLORIDE 1000 ML: 900 INJECTION, SOLUTION INTRAVENOUS at 08:02

## 2019-01-29 NOTE — LETTER
[address] [facility] EMERGENCY DEPT 
[facility] [address] [facility] [id number] [phone] Work/School Note Date: 1/29/2019 To Whom It May concern: 
 
Archana Roberts was seen and treated today in the emergency room by the following provider(s): 
Attending Provider: Diamond Varghese MD.   
 
Archana Roberts may return to work on Thursday, January 31, 2019.  
 
Sincerely, 
 
 
 
 
Shan Mcclendon RN

## 2019-01-29 NOTE — LETTER
[address] [facility] EMERGENCY DEPT 
[facility] [address] [place] [id number] 
[phone] Work/School Note Date: 1/29/2019 To Whom It May concern: 
 
Prachi Tamez was seen and treated today in the emergency room by the following provider(s): 
Attending Provider: Sami Castillo MD.   
 
Prachi Tamez may return to work on Thursday, January 31, 2019.  
 
Sincerely, 
 
 
 
 
Ita Galarza RN

## 2019-01-29 NOTE — ED NOTES
I have reviewed discharge instructions with the patient. The patient and spouse verbalized understanding. Patient left ED via Discharge Method: ambulatory to Home with spouse and mother. Opportunity for questions and clarification provided. Patient given 1 scripts. To continue your aftercare when you leave the hospital, you may receive an automated call from our care team to check in on how you are doing. This is a free service and part of our promise to provide the best care and service to meet your aftercare needs.  If you have questions, or wish to unsubscribe from this service please call [phone]. Thank you for Choosing our [facility] Emergency Department.

## 2019-01-29 NOTE — DISCHARGE INSTRUCTIONS
Patient Education        Seizure: Care Instructions  Your Care Instructions    Seizures are caused by abnormal patterns of electrical signals in the brain. They are different for each person. Seizures can affect movement, speech, vision, or awareness. Some people have only slight shaking of a hand and do not pass out. Other people may pass out and have violent shaking of the whole body. Some people appear to stare into space. They are awake, but they can't respond normally. Later, they may not remember what happened. You may need tests to identify the type and cause of the seizures. A seizure may occur only once, or you may have them more than one time. Taking medicines as directed and following up with your doctor may help keep you from having more seizures. The doctor has checked you carefully, but problems can develop later. If you notice any problems or new symptoms, get medical treatment right away. Follow-up care is a key part of your treatment and safety. Be sure to make and go to all appointments, and call your doctor if you are having problems. It's also a good idea to know your test results and keep a list of the medicines you take. How can you care for yourself at home? · Be safe with medicines. Take your medicines exactly as prescribed. Call your doctor if you think you are having a problem with your medicine. · Do not do any activity that could be dangerous to you or others until your doctor says it is safe to do so. For example, do not drive a car, operate machinery, swim, or climb ladders. · Be sure that anyone treating you for any health problem knows that you have had a seizure and what medicines you are taking for it. · Identify and avoid things that may make you more likely to have a seizure. These may include lack of sleep, alcohol or drug use, stress, or not eating. · Make sure you go to your follow-up appointment. When should you call for help?   Call 911 anytime you think you may need emergency care. For example, call if:    · You have another seizure.     · You have more than one seizure in 24 hours.     · You have new symptoms, such as trouble walking, speaking, or thinking clearly.    Call your doctor now or seek immediate medical care if:    · You are not acting normally.    Watch closely for changes in your health, and be sure to contact your doctor if you have any problems. Where can you learn more? Go to http://félix-audra.info/. Enter J262 in the search box to learn more about \"Seizure: Care Instructions. \"  Current as of: Jessica 3, 2018  Content Version: 11.9  © 6246-7010 [employer]. Care instructions adapted under license by [employer] (which disclaims liability or warranty for this information). If you have questions about a medical condition or this instruction, always ask your healthcare professional. [address] any warranty or liability for your use of this information.

## 2019-01-29 NOTE — ED PROVIDER NOTES
Patient seen by PCP 7/12/18. Note follows. She has been feeling like she is going to start to have seizures again. She says she had to take Ativan the past 2 days to prevent. She said the first time it happened that her O2 level was 89 and HR was 115. She reports feeling off yesterday. She attempted to set up the surgical room multiple times not realizing that she had previously done it. She called her mother to come pick her up from work and did not remember calling her. She has continued symptoms of dizziness and just 'not feeling right'. The dizziness makes her nauseous. Ativan allows her to sleep, but she is not fully recovering between episodes. She denies any abdominal pain as was previously associated with symptoms in past. AIP diagnosis was still in question when she last saw hemotologist in 2016. She had a hysterectomy in 2016 for endometriosis and has been well since then. She had only one seizure episode since hysterectomy prior to this past week. No TC seizures since surgery. Patient has been doing well up until this morning when she had a seizure. She is currently out of her Ativan. She has had multiple seizures with 1 and round. She will recover fully in between seizures. EMS gave 2 mg of Ativan and round. Patient states seizures are triggered by her porphyria which is still an unclear diagnosis per previous note. She states they're also triggered by stress which she is undergoing at work right now. Patient has seen a hematologist Dr. Ignacia Dover for the porphyria. She does not see a neurologist.  She is not on any antiseizure medication. The history is provided by the patient. No  was used. Seizure This is a recurrent problem. The current episode started less than 1 hour ago. The problem has been resolved. There were 4 - 5 seizures. The most recent episode lasted less than 30 seconds.  Pertinent negatives include no confusion, no headaches, no speech difficulty, no visual disturbance, no neck stiffness, no sore throat, no chest pain, no cough, no nausea, no vomiting, no diarrhea and no muscle weakness. Characteristics include rhythmic jerking. Characteristics do not include bit tongue. The episode was witnessed. There was no sensation of an aura present. There was return to baseline postseizure. The seizures did not continue in the ED. The seizure(s) had no focality. There has been no fever. She reports no chest pain, no confusion, no visual disturbance, no diarrhea, no vomiting, no headaches, no sore throat, no muscle weakness, no stiff neck, no speech difficulty, and no cough. Medications administered prior to arrival include Ativan IV. Home seizure medications include: no seizure medications. Past Medical History:  
Diagnosis Date  Abdominal pain  AIP (acute intermittent porphyria) ([facility] [address] dx: 2006 (age 16)  
 last tx with Panhematin: Dec 2013. last seen by hematology 2016. No issues since hysterectomy in 2016  Cancer ([facility] [address]   
 acute intermittent porphyria  Diarrhea  Ear infection  Easy bruisability  Endometriosis s/p hysterectomy  Frequent common colds  H/O seasonal allergies  Headache   
 Heavy menses  History of UTI  Irregular heart beat   
 hx. no murmur  Kidney stones 2017  Nausea & vomiting  Other ill-defined conditions(799.89)   
 pt states allergy to Port-a-Cath  
 Ovarian cyst 2004  Poor sleep  Ringing in ears  Seizure ([address]   
 most recent 3/20/16 - due to recent AIP attack Past Surgical History:  
Procedure Laterality Date  HX HYSTERECTOMY Rt ovary intact  HX LAP CHOLECYSTECTOMY  2005  HX OTHER SURGICAL  Oct 2015 (removal) port placement and removal  
 HX OVARIAN CYST REMOVAL  2004  HX RIGHT SALPINGO-OOPHORECTOMY  01/2019 Family History:  
Problem Relation Age of Onset  Diabetes Father  Hypertension Father  Heart Attack Father  Cancer Sister   
     cervical cancer  Other Sister Aneurysm  Ovarian Cancer Sister  Diabetes Mother  Hypertension Mother  Cancer Mother   
     cervical cancer and kidney cancer  Diabetes Sister  Diabetes Brother  Ovarian Cancer Sister   
     half sister Social History Socioeconomic History  Marital status: SINGLE Spouse name: Not on file  Number of children: Not on file  Years of education: Not on file  Highest education level: Not on file Social Needs  Financial resource strain: Not on file  Food insecurity - worry: Not on file  Food insecurity - inability: Not on file  Transportation needs - medical: Not on file  Transportation needs - non-medical: Not on file Occupational History  Not on file Tobacco Use  Smoking status: Never Smoker  Smokeless tobacco: Never Used Substance and Sexual Activity  Alcohol use: No  
  Alcohol/week: 0.0 oz  Drug use: Yes Types: Marijuana Comment: marijuana ~12 joints/day  Sexual activity: Yes  
  Partners: Male Birth control/protection: Surgical  
Other Topics Concern [address] Service Not Asked  Blood Transfusions Not Asked  Caffeine Concern Yes  Occupational Exposure Not Asked [employer] Hazards Not Asked  Sleep Concern Not Asked  Stress Concern Not Asked  Weight Concern Not Asked  Special Diet Not Asked  Back Care Not Asked  Exercise Yes  Bike Helmet Not Asked [address] Yes  Self-Exams Yes Social History Narrative Denies any sexual or physical abuse and feels safe at home. ALLERGIES: Amoxicillin; Hydrocodone-acetaminophen; Iodine; Ciprofloxacin; Other medication; Sulfa (sulfonamide antibiotics); and Tape [adhesive] Review of Systems Constitutional: Negative for chills and fever. HENT: Negative for rhinorrhea and sore throat. Eyes: Negative for pain, redness and visual disturbance. Respiratory: Negative for cough, chest tightness, shortness of breath and wheezing. Cardiovascular: Negative for chest pain and leg swelling. Gastrointestinal: Negative for abdominal pain, diarrhea, nausea and vomiting. Genitourinary: Negative for dysuria and hematuria. Musculoskeletal: Negative for back pain, gait problem, neck pain and neck stiffness. Skin: Negative for color change and rash. Neurological: Positive for seizures. Negative for speech difficulty, weakness, numbness and headaches. Psychiatric/Behavioral: Negative for confusion. Vitals:  
 01/29/19 0730 BP: 115/64 Pulse: 88 Resp: 16 Temp: 98.7 °F (37.1 °C) SpO2: 99% Weight: 68 kg (150 lb) Height: 5' 1\" (1.549 m) Physical Exam  
Constitutional: She is oriented to person, place, and time. She appears well-developed and well-nourished. HENT:  
Head: Normocephalic and atraumatic. Eyes: EOM are normal. Pupils are equal, round, and reactive to light. Neck: Normal range of motion. Neck supple. Cardiovascular: Normal rate and regular rhythm. Pulmonary/Chest: Effort normal and breath sounds normal.  
Abdominal: Soft. Bowel sounds are normal. There is no tenderness. Musculoskeletal: Normal range of motion. She exhibits no edema. Neurological: She is alert and oriented to person, place, and time. No cranial nerve deficit. She exhibits normal muscle tone. Coordination normal.  
Skin: Skin is warm and dry. MDM Number of Diagnoses or Management Options Diagnosis management comments: Patient with questionable seizure-like activity. States history of this with the porphyria. Nurse reports some questionable activity while drawing blood but patient able to follow commands during the activity. Patient also under a lot of stress right now.   We will discharge home on some Ativan which seemed to help in the past with follow-up with both her hematologist and neurologist. 
 
  
Amount and/or Complexity of Data Reviewed Clinical lab tests: ordered and reviewed Tests in the medicine section of CPT®: ordered and reviewed Patient Progress Patient progress: stable Procedures Results Include: 
 
Recent Results (from the past 24 hour(s)) CBC WITH AUTOMATED DIFF Collection Time: 01/29/19  8:03 AM  
Result Value Ref Range WBC 7.9 4.3 - 11.1 K/uL  
 RBC 4.43 4.05 - 5.2 M/uL  
 HGB 12.6 11.7 - 15.4 g/dL HCT 38.2 35.8 - 46.3 % MCV 86.2 79.6 - 97.8 FL  
 MCH 28.4 26.1 - 32.9 PG  
 MCHC 33.0 31.4 - 35.0 g/dL  
 RDW 12.0 11.9 - 14.6 % PLATELET 900 577 - 199 K/uL MPV 10.9 9.4 - 12.3 FL ABSOLUTE NRBC 0.00 0.0 - 0.2 K/uL  
 DF AUTOMATED NEUTROPHILS 68 43 - 78 % LYMPHOCYTES 25 13 - 44 % MONOCYTES 6 4.0 - 12.0 % EOSINOPHILS 0 (L) 0.5 - 7.8 % BASOPHILS 1 0.0 - 2.0 % IMMATURE GRANULOCYTES 0 0.0 - 5.0 %  
 ABS. NEUTROPHILS 5.4 1.7 - 8.2 K/UL  
 ABS. LYMPHOCYTES 1.9 0.5 - 4.6 K/UL  
 ABS. MONOCYTES 0.4 0.1 - 1.3 K/UL  
 ABS. EOSINOPHILS 0.0 0.0 - 0.8 K/UL  
 ABS. BASOPHILS 0.1 0.0 - 0.2 K/UL  
 ABS. IMM. GRANS. 0.0 0.0 - 0.5 K/UL METABOLIC PANEL, COMPREHENSIVE Collection Time: 01/29/19  8:03 AM  
Result Value Ref Range Sodium 142 136 - 145 mmol/L Potassium 3.6 3.5 - 5.1 mmol/L Chloride 109 (H) 98 - 107 mmol/L  
 CO2 24 21 - 32 mmol/L Anion gap 9 mmol/L Glucose 98 65 - 100 mg/dL BUN 8 6 - 23 MG/DL Creatinine 0.82 0.6 - 1.0 MG/DL  
 GFR est AA >60 >60 ml/min/1.73m2 GFR est non-AA >60 ml/min/1.73m2 Calcium 8.7 8.3 - 10.4 MG/DL Bilirubin, total 0.4 0.2 - 1.1 MG/DL  
 ALT (SGPT) 22 12 - 65 U/L  
 AST (SGOT) 17 15 - 37 U/L Alk. phosphatase 69 50 - 130 U/L Protein, total 7.1 g/dL Albumin 3.6 3.5 - 5.0 g/dL Globulin 3.5 2.3 - 3.5 g/dL A-G Ratio 1.0 POC LACTIC ACID Collection Time: 01/29/19  8:10 AM  
Result Value Ref Range Lactic Acid (POC) 0.70 0.5 - 1.9 mmol/L

## 2019-01-30 PROBLEM — F41.9 ANXIETY: Status: ACTIVE | Noted: 2019-01-30

## 2019-01-30 PROBLEM — G40.909 SEIZURE DISORDER (HCC): Status: ACTIVE | Noted: 2019-01-30

## 2019-06-13 ENCOUNTER — HOSPITAL ENCOUNTER (OUTPATIENT)
Dept: CT IMAGING | Age: 29
Discharge: HOME | End: 2019-06-13
Attending: PHYSICIAN ASSISTANT
Payer: MEDICARE

## 2019-06-13 DIAGNOSIS — R10.31 RIGHT LOWER QUADRANT PAIN: ICD-10-CM

## 2019-06-13 PROCEDURE — 74011636320 HC RX REV CODE- 636/320: Performed by: PHYSICIAN ASSISTANT

## 2019-06-13 PROCEDURE — 74177 CT ABD & PELVIS W/CONTRAST: CPT

## 2019-06-13 PROCEDURE — 74011000258 HC RX REV CODE- 258: Performed by: PHYSICIAN ASSISTANT

## 2019-06-13 RX ORDER — SODIUM CHLORIDE 0.9 % (FLUSH) 0.9 %
10 SYRINGE (ML) INJECTION
Status: COMPLETED | OUTPATIENT
Start: 2019-06-13 | End: 2019-06-13

## 2019-06-13 RX ADMIN — Medication 10 ML: at 15:26

## 2019-06-13 RX ADMIN — IOPAMIDOL 100 ML: 755 INJECTION, SOLUTION INTRAVENOUS at 15:26

## 2019-06-13 RX ADMIN — SODIUM CHLORIDE 100 ML: 900 INJECTION, SOLUTION INTRAVENOUS at 15:26

## 2019-06-13 RX ADMIN — DIATRIZOATE MEGLUMINE AND DIATRIZOATE SODIUM 15 ML: 660; 100 LIQUID ORAL; RECTAL at 15:26

## 2019-06-13 NOTE — PROGRESS NOTES
CT scan of the abdomen and pelvis is negative. Continue with current regimen. This could be due to musculoskeletal etiology. Try naproxen and heating pad at this time, could be radiating from back pain.

## 2019-06-13 NOTE — PROGRESS NOTES
Pt states iodine allergy is topical. Has had IV contrast in the past with no adverse reaction. Per Dr Nidia Reese, ok to inject patient without

## 2020-02-14 ENCOUNTER — HOSPITAL ENCOUNTER (OUTPATIENT)
Dept: ULTRASOUND IMAGING | Age: 30
Discharge: HOME | End: 2020-02-14
Attending: PHYSICIAN ASSISTANT
Payer: MEDICARE

## 2020-02-14 DIAGNOSIS — R10.10 UPPER ABDOMINAL PAIN: ICD-10-CM

## 2020-02-14 PROCEDURE — 76700 US EXAM ABDOM COMPLETE: CPT

## 2021-01-25 PROBLEM — Z87.898 HISTORY OF SEIZURES: Status: ACTIVE | Noted: 2019-01-30

## 2021-02-04 ENCOUNTER — HOSPITAL ENCOUNTER (EMERGENCY)
Age: 31
Discharge: HOME | End: 2021-02-04
Payer: COMMERCIAL

## 2021-02-04 VITALS
HEIGHT: 64 IN | WEIGHT: 168 LBS | RESPIRATION RATE: 16 BRPM | OXYGEN SATURATION: 97 % | SYSTOLIC BLOOD PRESSURE: 105 MMHG | HEART RATE: 79 BPM | TEMPERATURE: 97.9 F | BODY MASS INDEX: 28.68 KG/M2 | DIASTOLIC BLOOD PRESSURE: 58 MMHG

## 2021-02-04 DIAGNOSIS — R51.9 ACUTE NONINTRACTABLE HEADACHE, UNSPECIFIED HEADACHE TYPE: Primary | ICD-10-CM

## 2021-02-04 DIAGNOSIS — G43.019 INTRACTABLE MIGRAINE WITHOUT AURA AND WITHOUT STATUS MIGRAINOSUS: ICD-10-CM

## 2021-02-04 PROCEDURE — 96374 THER/PROPH/DIAG INJ IV PUSH: CPT

## 2021-02-04 PROCEDURE — 99283 EMERGENCY DEPT VISIT LOW MDM: CPT

## 2021-02-04 PROCEDURE — 74011250636 HC RX REV CODE- 250/636: Performed by: PHYSICIAN ASSISTANT

## 2021-02-04 PROCEDURE — 96375 TX/PRO/DX INJ NEW DRUG ADDON: CPT

## 2021-02-04 RX ORDER — METOCLOPRAMIDE HYDROCHLORIDE 5 MG/ML
10 INJECTION INTRAMUSCULAR; INTRAVENOUS
Status: COMPLETED | OUTPATIENT
Start: 2021-02-04 | End: 2021-02-04

## 2021-02-04 RX ORDER — DIPHENHYDRAMINE HYDROCHLORIDE 50 MG/ML
25 INJECTION, SOLUTION INTRAMUSCULAR; INTRAVENOUS
Status: COMPLETED | OUTPATIENT
Start: 2021-02-04 | End: 2021-02-04

## 2021-02-04 RX ORDER — KETOROLAC TROMETHAMINE 30 MG/ML
15 INJECTION, SOLUTION INTRAMUSCULAR; INTRAVENOUS ONCE
Status: COMPLETED | OUTPATIENT
Start: 2021-02-04 | End: 2021-02-04

## 2021-02-04 RX ADMIN — KETOROLAC TROMETHAMINE 15 MG: 30 INJECTION, SOLUTION INTRAMUSCULAR at 15:31

## 2021-02-04 RX ADMIN — SODIUM CHLORIDE 1000 ML: 900 INJECTION, SOLUTION INTRAVENOUS at 15:31

## 2021-02-04 RX ADMIN — METOCLOPRAMIDE HYDROCHLORIDE 10 MG: 5 INJECTION INTRAMUSCULAR; INTRAVENOUS at 15:31

## 2021-02-04 RX ADMIN — DIPHENHYDRAMINE HYDROCHLORIDE 25 MG: 50 INJECTION, SOLUTION INTRAMUSCULAR; INTRAVENOUS at 15:31

## 2021-02-04 NOTE — ED TRIAGE NOTES
Pt arrives ambulatory to triage. Pt reports Tuesday she woke up nauseous with stomach cramps. Took zofran and motrin and felt better. States when medicine wore off she started to feel bad again. States she was unable to go to work because of this. NAD. Masked. Able to keep juice down but not Dr. Delong Dears. States hx of migraines for which she takes imitrex for.

## 2021-02-04 NOTE — ED PROVIDER NOTES
Patient is a 52-year-old female history of migraines who comes in with a headache for the past week. She says that the headache is alleviated with imitriptan but she has been having nausea and vomiting for the past few days and the headache is persisting. She denies fever chills or sweats. She denies worse headache of life, thunderclap-like onset or sudden onset. She does report it is a bad headache in the sense that it is persisting longer than other headaches but the quality is not the most severe in her life. Headache   This is a new problem. The current episode started more than 1 week ago. The problem occurs constantly. The problem has not changed since onset. The headache is aggravated by nothing. The pain is moderate. Associated symptoms include nausea and vomiting. Pertinent negatives include no anorexia, no fever, no malaise/fatigue, no chest pressure, no near-syncope, no orthopnea, no palpitations, no syncope, no shortness of breath, no weakness, no tingling, no dizziness and no visual change. She has tried triptan therapy for the symptoms. The treatment provided moderate relief. 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 last seen by hematology 2016.  No issues since hysterectomy in 2016    Cancer [facility])     acute intermittent porphyria    Diarrhea     Ear infection     Easy bruisability     Endometriosis     s/p hysterectomy    Frequent common colds     H/O seasonal allergies     Headache     Heavy menses     History of seizures 1/30/2019    History of UTI     Irregular heart beat     hx. no murmur    Kidney stones 2017    Nausea & vomiting     Other ill-defined conditions(799.89)     pt states allergy to Port-a-Cath    Ovarian cyst 2004    Poor sleep     Ringing in ears     Seizure ([address]     most recent 3/20/16 - due to recent AIP attack     Seizure disorder ([facility] [address] 1/30/2019       Past Surgical History:   Procedure Laterality Date    HX HYSTERECTOMY      Rt ovary intact    HX LAP CHOLECYSTECTOMY  2005    HX OTHER SURGICAL  Oct 2015 (removal)    port placement and removal    HX OVARIAN CYST REMOVAL  2004    HX RIGHT SALPINGO-OOPHORECTOMY  01/2019         Family History:   Problem Relation Age of Onset    Diabetes Father     Hypertension Father     Heart Attack Father     Cancer Sister         cervical cancer    Other Sister         Aneurysm    Ovarian Cancer Sister     Diabetes Mother     Hypertension Mother     Cancer Mother         cervical cancer and kidney cancer    Diabetes Sister     Diabetes Brother     Ovarian Cancer Sister         half sister       Social History     Socioeconomic History    Marital status: SINGLE     Spouse name: Not on file    Number of children: Not on file    Years of education: Not on file    Highest education level: Not on file   Occupational History    Not on file   Social Needs    Financial resource strain: Not on file    Food insecurity     Worry: Not on file     Inability: Not on file    Transportation needs     Medical: Not on file     Non-medical: Not on file   Tobacco Use    Smoking status: Never Smoker    Smokeless tobacco: Never Used   Substance and Sexual Activity    Alcohol use: No     Alcohol/week: 0.0 standard drinks    Drug use: Yes     Types: Marijuana     Comment: marijuana ~12 joints/day    Sexual activity: Yes     Partners: Male     Birth control/protection: Surgical   Lifestyle    Physical activity     Days per week: Not on file     Minutes per session: Not on file    Stress: Not on file   Relationships    Social connections     Talks on phone: Not on file     Gets together: Not on file     Attends [employer] service: Not on file     Active member of club or organization: Not on file     Attends meetings of clubs or organizations: Not on file     Relationship status: Not on file  Intimate partner violence     Fear of current or ex partner: Not on file     Emotionally abused: Not on file     Physically abused: Not on file     Forced sexual activity: Not on file   Other Topics Concern     Service Not Asked    Blood Transfusions Not Asked    Caffeine Concern Yes    Occupational Exposure Not Asked    Hobby Hazards Not Asked    Sleep Concern Not Asked    Stress Concern Not Asked    Weight Concern Not Asked    Special Diet Not Asked    Back Care Not Asked    Exercise Yes    Bike Helmet Not Asked    Seat Belt Yes    Self-Exams Yes   Social History Narrative    Denies any sexual or physical abuse and feels safe at home. ALLERGIES: Amoxicillin, Hydrocodone-acetaminophen, Iodine, Ciprofloxacin, Other medication, Sulfa (sulfonamide antibiotics), and Tape [adhesive]    Review of Systems   Constitutional: Negative for fever and malaise/fatigue. HENT: Negative for congestion. Respiratory: Negative for shortness of breath. Cardiovascular: Negative for palpitations, orthopnea, syncope and near-syncope. Gastrointestinal: Positive for nausea and vomiting. Negative for anorexia. Neurological: Positive for headaches. Negative for dizziness, tingling, tremors, seizures, syncope, facial asymmetry, speech difficulty, weakness, light-headedness and numbness. All other systems reviewed and are negative. Vitals:    02/04/21 1452   BP: 112/63   Pulse: 87   Resp: 16   Temp: 97.9 °F (36.6 °C)   SpO2: 97%   Weight: 76.2 kg (168 lb)   Height: 5' 4\" (1.626 m)            Physical Exam  Vitals signs and nursing note reviewed. Constitutional:       General: She is not in acute distress. Appearance: Normal appearance. She is not ill-appearing, toxic-appearing or diaphoretic. HENT:      Head: Normocephalic and atraumatic. Eyes:      General: No visual field deficit.      Conjunctiva/sclera: Conjunctivae normal.   Cardiovascular:      Rate and Rhythm: Normal rate and regular rhythm. Pulses: Normal pulses. Heart sounds: Normal heart sounds. Pulmonary:      Effort: Pulmonary effort is normal. No respiratory distress. Breath sounds: Normal breath sounds and air entry. No stridor, decreased air movement or transmitted upper airway sounds. No decreased breath sounds, wheezing, rhonchi or rales. Chest:      Chest wall: No tenderness. Skin:     General: Skin is warm and dry. Neurological:      General: No focal deficit present. Mental Status: She is alert and oriented to person, place, and time. Mental status is at baseline. GCS: GCS eye subscore is 4. GCS verbal subscore is 5. GCS motor subscore is 6. Cranial Nerves: Cranial nerves are intact. No cranial nerve deficit, dysarthria or facial asymmetry. Sensory: Sensation is intact. No sensory deficit. Motor: Motor function is intact. No weakness. Coordination: Coordination is intact. Coordination normal.      Gait: Gait is intact. Gait normal.      Deep Tendon Reflexes: Reflexes are normal and symmetric. Reflexes normal.          MDM  Number of Diagnoses or Management Options  Acute nonintractable headache, unspecified headache type: new and requires workup  Diagnosis management comments: Patient is a 71-year-old female history of migraine headaches who comes in with a headache which has been persisting longer than her normal headaches. She denies red flag symptoms, her main concern is that it is persisting longer than her normal headaches. She has no red flag symptoms and her neurologic exam is reassuring. She was given IV fluids, Reglan, Toradol and Benadryl and had complete symptomatic relief. She is advised to follow-up closely with her primary doctor and if symptoms severely worsened, she is to come back for reevaluation in the ER. She feels comfortable going home as she has complete resolution of symptoms at time of discharge.     Risk of Complications, Morbidity, and/or Mortality  Presenting problems: moderate  Diagnostic procedures: low  Management options: low    Patient Progress  Patient progress: resolved         Procedures

## 2021-02-04 NOTE — DISCHARGE INSTRUCTIONS
Follow-up closely with the primary doctor and if symptoms severely worsen please return to the emergency room.

## 2021-02-04 NOTE — Clinical Note
[facility] EMERGENCY DEPT 
ONE  [address] [facility] [address] 
[phone] Work/School Note Date: 2/4/2021 To Whom It May concern: 
 
Rod Kaba was seen and treated today in the emergency room by the following provider(s): 
Attending Provider: Olga Snow MD 
Physician Assistant: Delfin Villarreal, [address]. Rod Kaba is excused from work/school on 02/04/21 and 02/05/21. She is medically clear to return to work/school on 2/6/2021. Sincerely, Maryanne Montemayor, [address]

## 2021-04-17 ENCOUNTER — HOSPITAL ENCOUNTER (EMERGENCY)
Age: 31
Discharge: HOME | End: 2021-04-17
Attending: EMERGENCY MEDICINE
Payer: COMMERCIAL

## 2021-04-17 VITALS
RESPIRATION RATE: 16 BRPM | DIASTOLIC BLOOD PRESSURE: 64 MMHG | TEMPERATURE: 98.5 F | OXYGEN SATURATION: 98 % | BODY MASS INDEX: 29.02 KG/M2 | HEART RATE: 92 BPM | SYSTOLIC BLOOD PRESSURE: 106 MMHG | WEIGHT: 170 LBS | HEIGHT: 64 IN

## 2021-04-17 DIAGNOSIS — R06.2 WHEEZING: ICD-10-CM

## 2021-04-17 DIAGNOSIS — J30.89 ENVIRONMENTAL AND SEASONAL ALLERGIES: Primary | ICD-10-CM

## 2021-04-17 PROCEDURE — 99282 EMERGENCY DEPT VISIT SF MDM: CPT

## 2021-04-17 RX ORDER — PREDNISONE 20 MG/1
20 TABLET ORAL DAILY
Qty: 5 TAB | Refills: 0 | Status: SHIPPED | OUTPATIENT
Start: 2021-04-17 | End: 2021-04-19 | Stop reason: ALTCHOICE

## 2021-04-17 RX ORDER — INHALER, ASSIST DEVICES
1 SPACER (EA) MISCELLANEOUS AS NEEDED
Qty: 1 EACH | Refills: 0 | Status: SHIPPED | OUTPATIENT
Start: 2021-04-17

## 2021-04-17 RX ORDER — ALBUTEROL SULFATE 90 UG/1
1 AEROSOL, METERED RESPIRATORY (INHALATION)
Qty: 1 INHALER | Refills: 0 | Status: SHIPPED | OUTPATIENT
Start: 2021-04-17 | End: 2021-09-02 | Stop reason: SDUPTHER

## 2021-04-17 NOTE — ED PROVIDER NOTES
Patient is a 32-year-old female with history of seasonal allergies who comes in with nasal congestion, runny nose, sneezing, mild cough and intermittent wheezing. Patient reports that she tends to get wheezing when she has allergy symptoms. She says she took a puff of her mother's inhaler and it helped with her wheezing and chest tightness. Patient denies any shortness of breath but does report that when she walks long distances she feels that she needs to use the inhaler because she starts wheezing. She denies any fevers, chills or sweats. 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 last seen by hematology 2016.  No issues since hysterectomy in 2016    Cancer [facility])     acute intermittent porphyria    Diarrhea     Ear infection     Easy bruisability     Endometriosis     s/p hysterectomy    Frequent common colds     H/O seasonal allergies     Headache     Heavy menses     History of seizures 1/30/2019    History of UTI     Irregular heart beat     hx. no murmur    Kidney stones 2017    Nausea & vomiting     Other ill-defined conditions(799.89)     pt states allergy to Port-a-Cath    Ovarian cyst 2004    Poor sleep     Ringing in ears     Seizure ([address]     most recent 3/20/16 - due to recent AIP attack     Seizure disorder ([address] 1/30/2019       Past Surgical History:   Procedure Laterality Date    HX HYSTERECTOMY      Rt ovary intact    HX LAP CHOLECYSTECTOMY  2005    HX OTHER SURGICAL  Oct 2015 (removal)    port placement and removal    HX OVARIAN CYST REMOVAL  2004    HX RIGHT SALPINGO-OOPHORECTOMY  01/2019         Family History:   Problem Relation Age of Onset    Diabetes Father     Hypertension Father     Heart Attack Father     Cancer Sister         cervical cancer    Other Sister         Aneurysm    Ovarian Cancer Sister     Diabetes Mother    Kelly Salinas Hypertension Mother     Cancer Mother         cervical cancer and kidney cancer    Diabetes Sister     Diabetes Brother     Ovarian Cancer Sister         half sister       Social History     Socioeconomic History    Marital status: SINGLE     Spouse name: Not on file    Number of children: Not on file    Years of education: Not on file    Highest education level: Not on file   Occupational History    Not on file   Social Needs    Financial resource strain: Not on file    Food insecurity     Worry: Not on file     Inability: Not on file    Transportation needs     Medical: Not on file     Non-medical: Not on file   Tobacco Use    Smoking status: Never Smoker    Smokeless tobacco: Never Used   Substance and Sexual Activity    Alcohol use: No     Alcohol/week: 0.0 standard drinks    Drug use: Yes     Types: Marijuana     Comment: marijuana ~12 joints/day    Sexual activity: Yes     Partners: Male     Birth control/protection: Surgical   Lifestyle    Physical activity     Days per week: Not on file     Minutes per session: Not on file    Stress: Not on file   Relationships    Social connections     Talks on phone: Not on file     Gets together: Not on file     Attends Religion service: Not on file     Active member of club or organization: Not on file     Attends meetings of clubs or organizations: Not on file     Relationship status: Not on file    Intimate partner violence     Fear of current or ex partner: Not on file     Emotionally abused: Not on file     Physically abused: Not on file     Forced sexual activity: Not on file   Other Topics Concern     Service Not Asked    Blood Transfusions Not Asked    Caffeine Concern Yes    Occupational Exposure Not Asked   Donnita Munda Hazards Not Asked    Sleep Concern Not Asked    Stress Concern Not Asked    Weight Concern Not Asked    Special Diet Not Asked    Back Care Not Asked    Exercise Yes    Bike Helmet Not Asked   [address] [facility] Yes  Self-Exams Yes   Social History Narrative    Denies any sexual or physical abuse and feels safe at home. ALLERGIES: Amoxicillin, Hydrocodone-acetaminophen, Iodine, Ciprofloxacin, Other medication, Sulfa (sulfonamide antibiotics), and Tape [adhesive]    Review of Systems   Constitutional: Negative for chills and fever. HENT: Positive for congestion, ear pain, postnasal drip, rhinorrhea, sinus pressure, sneezing and sore throat. Negative for ear discharge, sinus pain, tinnitus and trouble swallowing. Respiratory: Positive for cough. Negative for shortness of breath. Cardiovascular: Negative for chest pain. Skin: Negative for color change. All other systems reviewed and are negative. Vitals:    04/17/21 1356   BP: 106/64   Pulse: 92   Resp: 16   Temp: 98.5 °F (36.9 °C)   SpO2: 98%   Weight: 77.1 kg (170 lb)   Height: 5' 4\" (1.626 m)            Physical Exam  Vitals signs and nursing note reviewed. Constitutional:       General: She is not in acute distress. Appearance: Normal appearance. She is not ill-appearing, toxic-appearing or diaphoretic. HENT:      Head: Normocephalic and atraumatic. Right Ear: Tympanic membrane, ear canal and external ear normal.      Left Ear: Tympanic membrane, ear canal and external ear normal.      Nose: Congestion and rhinorrhea present. Mouth/Throat:      Mouth: Mucous membranes are moist.      Pharynx: Oropharynx is clear. Uvula midline. No oropharyngeal exudate or posterior oropharyngeal erythema. Tonsils: No tonsillar exudate or tonsillar abscesses. Comments: Postnasal drip noted. Eyes:      Conjunctiva/sclera: Conjunctivae normal.      Pupils: Pupils are equal, round, and reactive to light. Cardiovascular:      Rate and Rhythm: Normal rate and regular rhythm. Pulses: Normal pulses. Pulmonary:      Effort: Pulmonary effort is normal. No respiratory distress. Breath sounds: Normal air entry.  No stridor, decreased air movement or transmitted upper airway sounds. No decreased breath sounds. Skin:     General: Skin is warm and dry. Neurological:      General: No focal deficit present. Mental Status: She is alert and oriented to person, place, and time. Mental status is at baseline. MDM  Number of Diagnoses or Management Options  Environmental and seasonal allergies: new and requires workup  Wheezing: new and requires workup  Diagnosis management comments: Patient is a 28-year-old female with history of seasonal allergies who comes in with concern for breakthrough allergy symptoms including a runny nose nasal congestion sneezing ear fullness and she also reports she has been having wheezing. Her wheezing responded well to her mother's albuterol inhaler. I will prescribe her albuterol inhaler and short course of steroids and advised her to continue taking allergy medications as prescribed as well as Flonase and doing nasal rinses. Return precautions were discussed, patient is well-appearing with normal vital signs and normal oxygen saturation at 98% on room air.        Amount and/or Complexity of Data Reviewed  Tests in the medicine section of CPT®: reviewed and ordered    Risk of Complications, Morbidity, and/or Mortality  Presenting problems: moderate  Diagnostic procedures: low  Management options: low    Patient Progress  Patient progress: stable         Procedures

## 2021-04-17 NOTE — ED TRIAGE NOTES
Patient ambulatory to triage with mask in place. Patient reports hx of seasonal allergies and reports she has been having trouble with them. Pt reports chest congestion, sore throat and bilateral ear pain. Pt reports taking benadryl, allegra and Flonase as well as her mother's breathing treatment.

## 2021-04-17 NOTE — Clinical Note
[facility] EMERGENCY DEPT 
[address] [facility] [address] 
[phone] Work/School Note Date: 4/17/2021 To Whom It May concern: 
 
Ellen Murguia was seen and treated today in the emergency room by the following provider(s): 
Attending Provider: Vandana De Los Santos MD 
Physician Assistant: Halle Gamez, [address]. Ellen Murguia is excused from work/school on 04/17/21 and 04/18/21. She is medically clear to return to work/school on 4/19/2021. Sincerely, Victoriano Sweet, [address]

## 2021-04-17 NOTE — ED NOTES
I have reviewed discharge instructions with the patient. The patient verbalized understanding. Patient left ED via Discharge Method: ambulatory to Home with self  Opportunity for questions and clarification provided. Patient given 3 scripts. To continue your aftercare when you leave the hospital, you may receive an automated call from our care team to check in on how you are doing. This is a free service and part of our promise to provide the best care and service to meet your aftercare needs.  If you have questions, or wish to unsubscribe from this service please call [phone]. Thank you for Choosing our [facility] Emergency Department.

## 2021-04-17 NOTE — DISCHARGE INSTRUCTIONS
Please take allergy medication such as Claritin or Zyrtec daily for allergy symptoms. Also rinse out your nasal passages with a Ginger pot followed by using Flonase daily. You may take the albuterol inhaler every 4-6 hours as needed for wheezing. Take a short course of steroids as prescribed and return for any severely worsening or emergent symptoms. Please follow-up closely with the primary doctor.

## 2021-08-04 ENCOUNTER — HOSPITAL ENCOUNTER (OUTPATIENT)
Dept: GENERAL RADIOLOGY | Age: 31
Discharge: HOME | End: 2021-08-04
Payer: COMMERCIAL

## 2021-08-04 DIAGNOSIS — R05.9 COUGH: ICD-10-CM

## 2021-08-04 PROCEDURE — 71046 X-RAY EXAM CHEST 2 VIEWS: CPT

## 2021-11-04 ENCOUNTER — HOSPITAL ENCOUNTER (OUTPATIENT)
Dept: GENERAL RADIOLOGY | Age: 31
Discharge: HOME | End: 2021-11-04
Payer: COMMERCIAL

## 2021-11-04 DIAGNOSIS — N30.01 ACUTE CYSTITIS WITH HEMATURIA: ICD-10-CM

## 2021-11-04 DIAGNOSIS — Z87.442 HISTORY OF KIDNEY STONES: ICD-10-CM

## 2021-11-04 DIAGNOSIS — R31.9 HEMATURIA, UNSPECIFIED TYPE: ICD-10-CM

## 2021-11-04 PROCEDURE — 74019 RADEX ABDOMEN 2 VIEWS: CPT

## 2022-03-18 PROBLEM — Z87.898 HISTORY OF SEIZURES: Status: ACTIVE | Noted: 2019-01-30

## 2022-03-18 PROBLEM — J30.89 ENVIRONMENTAL AND SEASONAL ALLERGIES: Status: ACTIVE | Noted: 2021-04-17

## 2022-03-19 PROBLEM — G43.019 INTRACTABLE MIGRAINE WITHOUT AURA AND WITHOUT STATUS MIGRAINOSUS: Status: ACTIVE | Noted: 2017-08-11

## 2022-03-19 PROBLEM — R51.9 ACUTE NONINTRACTABLE HEADACHE: Status: ACTIVE | Noted: 2021-02-04

## 2022-03-19 PROBLEM — F41.9 ANXIETY: Status: ACTIVE | Noted: 2019-01-30

## 2022-03-19 PROBLEM — R06.2 WHEEZING: Status: ACTIVE | Noted: 2021-04-17

## 2022-06-11 ENCOUNTER — APPOINTMENT (OUTPATIENT)
Dept: CT IMAGING | Age: 32
End: 2022-06-11

## 2022-06-11 ENCOUNTER — HOSPITAL ENCOUNTER (EMERGENCY)
Age: 32
Discharge: HOME | End: 2022-06-11
Attending: EMERGENCY MEDICINE

## 2022-06-11 VITALS
RESPIRATION RATE: 16 BRPM | OXYGEN SATURATION: 98 % | SYSTOLIC BLOOD PRESSURE: 112 MMHG | HEART RATE: 62 BPM | DIASTOLIC BLOOD PRESSURE: 73 MMHG | HEIGHT: 64 IN | TEMPERATURE: 98.4 F | BODY MASS INDEX: 29.02 KG/M2 | WEIGHT: 170 LBS

## 2022-06-11 DIAGNOSIS — R10.9 ABDOMINAL PAIN, UNSPECIFIED ABDOMINAL LOCATION: Primary | ICD-10-CM

## 2022-06-11 LAB
ALBUMIN SERPL-MCNC: 3.5 G/DL (ref 3.5–5)
ALBUMIN/GLOB SERPL: 0.9 {RATIO} (ref 1.2–3.5)
ALP SERPL-CCNC: 84 U/L (ref 50–136)
ALT SERPL-CCNC: 21 U/L (ref 12–65)
ANION GAP SERPL CALC-SCNC: 7 MMOL/L (ref 7–16)
AST SERPL-CCNC: 8 U/L (ref 15–37)
BASOPHILS # BLD: 0.1 K/UL (ref 0–0.2)
BASOPHILS NFR BLD: 1 % (ref 0–2)
BILIRUB SERPL-MCNC: 0.3 MG/DL (ref 0.2–1.1)
BILIRUB UR QL: NEGATIVE
BUN SERPL-MCNC: 6 MG/DL (ref 6–23)
CALCIUM SERPL-MCNC: 8.9 MG/DL (ref 8.3–10.4)
CHLORIDE SERPL-SCNC: 109 MMOL/L (ref 98–107)
CO2 SERPL-SCNC: 24 MMOL/L (ref 21–32)
CREAT SERPL-MCNC: 0.9 MG/DL (ref 0.6–1)
DIFFERENTIAL METHOD BLD: NORMAL
EOSINOPHIL # BLD: 0.1 K/UL (ref 0–0.8)
EOSINOPHIL NFR BLD: 1 % (ref 0.5–7.8)
ERYTHROCYTE [DISTWIDTH] IN BLOOD BY AUTOMATED COUNT: 12.6 % (ref 11.9–14.6)
GLOBULIN SER CALC-MCNC: 3.8 G/DL (ref 2.3–3.5)
GLUCOSE SERPL-MCNC: 111 MG/DL (ref 65–100)
GLUCOSE UR QL STRIP.AUTO: NEGATIVE MG/DL
HCT VFR BLD AUTO: 40.2 % (ref 35.8–46.3)
HGB BLD-MCNC: 13.2 G/DL (ref 11.7–15.4)
IMM GRANULOCYTES # BLD AUTO: 0 K/UL (ref 0–0.5)
IMM GRANULOCYTES NFR BLD AUTO: 0 % (ref 0–5)
KETONES UR-MCNC: NEGATIVE MG/DL
LEUKOCYTE ESTERASE UR QL STRIP: NEGATIVE
LIPASE SERPL-CCNC: 75 U/L (ref 73–393)
LYMPHOCYTES # BLD: 2.2 K/UL (ref 0.5–4.6)
LYMPHOCYTES NFR BLD: 27 % (ref 13–44)
MCH RBC QN AUTO: 28.4 PG (ref 26.1–32.9)
MCHC RBC AUTO-ENTMCNC: 32.8 G/DL (ref 31.4–35)
MCV RBC AUTO: 86.5 FL (ref 79.6–97.8)
MONOCYTES # BLD: 0.4 K/UL (ref 0.1–1.3)
MONOCYTES NFR BLD: 5 % (ref 4–12)
NEUTS SEG # BLD: 5.2 K/UL (ref 1.7–8.2)
NEUTS SEG NFR BLD: 66 % (ref 43–78)
NITRITE UR QL: NEGATIVE
NRBC # BLD: 0 K/UL (ref 0–0.2)
PH UR: 7 [PH] (ref 5–9)
PLATELET # BLD AUTO: 292 K/UL (ref 150–450)
PMV BLD AUTO: 10.3 FL (ref 9.4–12.3)
POTASSIUM SERPL-SCNC: 4.1 MMOL/L (ref 3.5–5.1)
PROT SERPL-MCNC: 7.3 G/DL (ref 6.3–8.2)
PROT UR QL: NEGATIVE MG/DL
RBC # BLD AUTO: 4.65 M/UL (ref 4.05–5.2)
RBC # UR STRIP: NEGATIVE /UL
SERVICE CMNT-IMP: NORMAL
SODIUM SERPL-SCNC: 140 MMOL/L (ref 136–145)
SP GR UR: 1.01 (ref 1–1.02)
UROBILINOGEN UR QL: 0.2 EU/DL (ref 0.2–1)
WBC # BLD AUTO: 8 K/UL (ref 4.3–11.1)

## 2022-06-11 PROCEDURE — 99284 EMERGENCY DEPT VISIT MOD MDM: CPT

## 2022-06-11 PROCEDURE — 81003 URINALYSIS AUTO W/O SCOPE: CPT

## 2022-06-11 PROCEDURE — 83690 ASSAY OF LIPASE: CPT

## 2022-06-11 PROCEDURE — 85025 COMPLETE CBC W/AUTO DIFF WBC: CPT

## 2022-06-11 PROCEDURE — 80053 COMPREHEN METABOLIC PANEL: CPT

## 2022-06-11 PROCEDURE — 74176 CT ABD & PELVIS W/O CONTRAST: CPT

## 2022-06-11 RX ORDER — SODIUM CHLORIDE 0.9 % (FLUSH) 0.9 %
3 SYRINGE (ML) INJECTION EVERY 8 HOURS
Status: DISCONTINUED | OUTPATIENT
Start: 2022-06-11 | End: 2022-06-11 | Stop reason: HOSPADM

## 2022-06-11 ASSESSMENT — LIFESTYLE VARIABLES
HOW MANY STANDARD DRINKS CONTAINING ALCOHOL DO YOU HAVE ON A TYPICAL DAY: 3 OR 4
HOW OFTEN DO YOU HAVE A DRINK CONTAINING ALCOHOL: 2-4 TIMES A MONTH

## 2022-06-11 ASSESSMENT — PAIN DESCRIPTION - LOCATION: LOCATION: ABDOMEN

## 2022-06-11 ASSESSMENT — PAIN SCALES - GENERAL: PAINLEVEL_OUTOF10: 6

## 2022-06-11 ASSESSMENT — PAIN DESCRIPTION - ORIENTATION: ORIENTATION: LOWER

## 2022-06-11 ASSESSMENT — PAIN DESCRIPTION - DESCRIPTORS: DESCRIPTORS: BURNING

## 2022-06-11 NOTE — ED TRIAGE NOTES
Pt arrives ambulatory to triage. States nausea at home unrelieved by zofran. Pt states diarrhea as well and lower abd burning into back on right side. Pt has had colitis in the past. NAD. Masked.

## 2022-06-11 NOTE — ED NOTES
I have reviewed discharge instructions with the patient. The patient verbalized understanding. Patient left ED via Discharge Method: ambulatory to Home with self  Opportunity for questions and clarification provided. Patient given 0 scripts. To continue your aftercare when you leave the hospital, you may receive an automated call from our care team to check in on how you are doing. This is a free service and part of our promise to provide the best care and service to meet your aftercare needs.  If you have questions, or wish to unsubscribe from this service please call [phone]. Thank you for Choosing our [facility] Emergency Department.       Sudheer Mclean RN  06/11/22 1943

## 2022-06-11 NOTE — Clinical Note
Saturnino Sanchez was seen and treated in our emergency department on 6/11/2022. She may return to work on 06/13/2022. Return sooner if improved     If you have any questions or concerns, please don't hesitate to call.       Garcia Melendrez MD

## 2022-06-13 ENCOUNTER — OFFICE VISIT (OUTPATIENT)
Dept: INTERNAL MEDICINE CLINIC | Facility: CLINIC | Age: 32
End: 2022-06-13
Payer: COMMERCIAL

## 2022-06-13 VITALS
HEIGHT: 64 IN | BODY MASS INDEX: 27.66 KG/M2 | WEIGHT: 162 LBS | RESPIRATION RATE: 16 BRPM | HEART RATE: 70 BPM | OXYGEN SATURATION: 97 % | SYSTOLIC BLOOD PRESSURE: 110 MMHG | DIASTOLIC BLOOD PRESSURE: 68 MMHG

## 2022-06-13 DIAGNOSIS — Z87.19 HISTORY OF COLITIS: ICD-10-CM

## 2022-06-13 DIAGNOSIS — R10.13 EPIGASTRIC BURNING SENSATION: Primary | ICD-10-CM

## 2022-06-13 DIAGNOSIS — R14.0 ABDOMINAL BLOATING: ICD-10-CM

## 2022-06-13 PROCEDURE — G8427 DOCREV CUR MEDS BY ELIG CLIN: HCPCS | Performed by: FAMILY MEDICINE

## 2022-06-13 PROCEDURE — 99213 OFFICE O/P EST LOW 20 MIN: CPT | Performed by: FAMILY MEDICINE

## 2022-06-13 PROCEDURE — G8419 CALC BMI OUT NRM PARAM NOF/U: HCPCS | Performed by: FAMILY MEDICINE

## 2022-06-13 PROCEDURE — 1036F TOBACCO NON-USER: CPT | Performed by: FAMILY MEDICINE

## 2022-06-13 RX ORDER — PANTOPRAZOLE SODIUM 40 MG/1
40 TABLET, DELAYED RELEASE ORAL
Qty: 30 TABLET | Refills: 5 | Status: SHIPPED | OUTPATIENT
Start: 2022-06-13 | End: 2022-07-11

## 2022-06-13 RX ORDER — HYOSCYAMINE SULFATE 0.12 MG/1
1 TABLET SUBLINGUAL EVERY 6 HOURS PRN
Qty: 30 EACH | Refills: 2 | Status: SHIPPED | OUTPATIENT
Start: 2022-06-13 | End: 2022-07-11

## 2022-06-13 ASSESSMENT — PATIENT HEALTH QUESTIONNAIRE - PHQ9
SUM OF ALL RESPONSES TO PHQ QUESTIONS 1-9: 0
2. FEELING DOWN, DEPRESSED OR HOPELESS: 0
1. LITTLE INTEREST OR PLEASURE IN DOING THINGS: 0
SUM OF ALL RESPONSES TO PHQ QUESTIONS 1-9: 0
SUM OF ALL RESPONSES TO PHQ QUESTIONS 1-9: 0
SUM OF ALL RESPONSES TO PHQ9 QUESTIONS 1 & 2: 0
SUM OF ALL RESPONSES TO PHQ QUESTIONS 1-9: 0

## 2022-06-13 NOTE — PROGRESS NOTES
Hakeem Shine DO  [employer]iplomate of the [employer] of [address] [place] [address] Internal Medicine      Elia Clinton (: 1990) is a 28 y.o. female, here for evaluation of the following chief complaint(s):  Nausea (appox 1 week) and Abdominal Pain (right sided)       ASSESSMENT/PLAN:  1. Epigastric burning sensation  -     pantoprazole (PROTONIX) 40 MG tablet; Take 1 tablet by mouth every morning (before breakfast), Disp-30 tablet, R-5Normal  -     [facility]  2. Abdominal bloating  -     Hyoscyamine Sulfate SL (LEVSIN/SL) 0.125 MG SUBL; Place 1 tablet under the tongue every 6 hours as needed (abdominal cramping), Disp-30 each, R-2Normal  -     [facility]  3. History of colitis  -     [facility]     -Will start on pantoprazole as ordered. Anti-reflux precautions.  -Levsin prn for abdominal cramping.  -Reviewed ER records/CT. -GI referral for further workup.  -Further recommendations pending clinical course and workup. SUBJECTIVE/OBJECTIVE:  HPI:  -Pt went to ER due to worsening epigastric burning/nausea/abd bloating/diarrhea. Hx of previous cholecystectomy. Hx of colitis. In ER, CT abd neg for acute findings.    -Diarrhea has improved, but still with epigastric burning/nausea/abd cramping-especially after eating. No melena or hematochezia. Able to eat bland foods. Toleratinge PO liquids. No fevers. ROS negative except as noted above today. Social History     Tobacco Use    Smoking status: Never Smoker    Smokeless tobacco: Never Used   Substance Use Topics    Alcohol use: No     Alcohol/week: 0.0 standard drinks    Drug use: Yes     Types: Marijuana (Weed)     Vitals:    22 1506   BP: 110/68   Site: Left Upper Arm   Position: Sitting   Pulse: 70   Resp: 16   SpO2: 97%   Weight: 162 lb (73.5 kg)   Height: 5' 4\" (1.626 m)      Body mass index is 27.81 kg/m². Physical Exam  Vitals reviewed. Cardiovascular:      Rate and Rhythm: Normal rate and regular rhythm. Pulmonary:      Effort: Pulmonary effort is normal.      Breath sounds: Normal breath sounds. Abdominal:      General: Abdomen is flat. Bowel sounds are normal.      Palpations: Abdomen is soft. There is no hepatomegaly or splenomegaly. Tenderness: There is abdominal tenderness in the epigastric area. There is no guarding or rebound. Skin:     General: Skin is warm and dry. Neurological:      Mental Status: She is alert. An electronic signature was used to authenticate this note.   Debora Moseley DO

## 2022-06-17 ASSESSMENT — ENCOUNTER SYMPTOMS
DIARRHEA: 1
ANAL BLEEDING: 0
CONSTIPATION: 0
BLOOD IN STOOL: 0
ABDOMINAL PAIN: 1

## 2022-06-17 NOTE — ED PROVIDER NOTES
[facility] Emergency Department Provider Note                   PCP:                Daniel Alicia DO               Age: 28 y.o. Sex: female       ICD-10-CM    1. Abdominal pain, unspecified abdominal location  R10.9     Flank and right lower       DISPOSITION Decision To Discharge 06/11/2022 07:05:39 PM       Discharge Medication List as of 6/11/2022  7:10 PM          Orders Placed This Encounter   Procedures    CT ABDOMEN PELVIS WO CONTRAST Additional Contrast? None    CBC with Diff    CMP    Lipase    POCT Urine Dipstick    POCT Urine Dipstick    POC Pregnancy Urine Qual    POCT Urinalysis no Micro    Saline lock IV        Priti Hammond MD, MD 11:06 AM      MDM  Number of Diagnoses or Management Options  Abdominal pain, unspecified abdominal location  Diagnosis management comments: Patient with abdominal pain now several days in duration. She has long complicated abdominal history including hysterectomy and cholecystectomy. She does not have any acute surgical findings either on exam or CT scan on this day. She is aware with her Legacy use a low threshold to return with any worsening. May also need to see GI for follow-up for their specialty opinion on issues.        Amount and/or Complexity of Data Reviewed  Clinical lab tests: ordered and reviewed  Tests in the radiology section of CPT®: ordered and reviewed  Review and summarize past medical records: yes  Independent visualization of images, tracings, or specimens: yes    Risk of Complications, Morbidity, and/or Mortality  Presenting problems: high  Diagnostic procedures: high  Management options: high    Patient Progress  Patient progress: improved (Patient feeling better at the time of discharge)       Jose Daniel Mojica is a 28 y.o. female who presents to the Emergency Department with chief complaint of    Chief Complaint   Patient presents with    Abdominal Pain      Patient delayed note initial contact on 65 had software errors that prevented documentation. She has some right side pain goes to the right flank per patient. She has had fever with this she has a burning sensation to her stomach when she eats or drinks. She has had hysterectomy in the past.  Also has had her gallbladder out. Does have history of endometriosis. Has long complicated history of GI issues. Symptoms of are now been ongoing since probably Monday. She has had some diarrhea. She has not noticed any blood or mucus to her stool. She has tried Zofran without relief. The history is provided by the patient. All other systems reviewed and are negative. Review of Systems   Constitutional: Negative for chills and fever. Gastrointestinal: Positive for abdominal pain and diarrhea. Negative for anal bleeding, blood in stool and constipation. Genitourinary: Negative. All other systems reviewed and are negative. 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 last seen by hematology 2016.  No issues since hysterectomy in 2016    Cancer [facility])     acute intermittent porphyria    Diarrhea     Ear infection     Easy bruisability     Endometriosis     s/p hysterectomy    Frequent common colds     H/O seasonal allergies     Headache     Heavy menses     History of seizures 1/30/2019    History of UTI     Irregular heart beat     hx. no murmur    Kidney stones 2017    Nausea & vomiting     Other ill-defined conditions(799.89)     pt states allergy to Port-a-Cath    Ovarian cyst 2004    Poor sleep     Ringing in ears     Seizure ([address]     most recent 3/20/16 - due to recent AIP attack     Seizure disorder ([address] 1/30/2019        Past Surgical History:   Procedure Laterality Date    CHOLECYSTECTOMY, LAPAROSCOPIC  2005    HYSTERECTOMY (CERVIX STATUS UNKNOWN)      Rt ovary intact    OTHER SURGICAL HISTORY  Oct 2015 (removal)    port placement and removal    OVARIAN CYST REMOVAL  2004    SALPINGO-OOPHORECTOMY  01/2019        Family History   Problem Relation Age of Onset    Diabetes Father     Hypertension Father     Heart Attack Father     Cancer Sister         cervical cancer    Other Sister         Aneurysm    Ovarian Cancer Sister         half sister    Diabetes Mother     Hypertension Mother     Cancer Mother         cervical cancer and kidney cancer    Diabetes Sister     Diabetes Brother     Ovarian Cancer Sister            Social Connections:     Frequency of Communication with Friends and Family: Not on file    Frequency of Social Gatherings with Friends and Family: Not on file    Attends Restorationism Services: Not on file    Active Member of [address] [place] [address] [employer] or Organizations: Not on file    Attends Club or Organization Meetings: Not on file    Marital Status: Not on file        Allergies   Allergen Reactions    Amoxicillin Other (See Comments)    Hydrocodone-Acetaminophen Other (See Comments)    Meperidine      Other reaction(s): Abdominal pain-Intolerance  seizures    Iodine Rash    Adhesive Tape      Other reaction(s): Contact Dermatitis  Dermabond    Ondansetron Hcl      Other reaction(s): Abdominal pain-Intolerance    Ciprofloxacin Rash     Arm, fingers, stomach    Sulfa Antibiotics Rash     fever        Vitals signs and nursing note reviewed. No data found. Physical Exam  Vitals and nursing note reviewed. HENT:      Head: Atraumatic. Right Ear: External ear normal.      Left Ear: External ear normal.      Nose: Nose normal.   Eyes:      General: No scleral icterus. Cardiovascular:      Rate and Rhythm: Normal rate and regular rhythm. Heart sounds: Normal heart sounds. Pulmonary:      Effort: Pulmonary effort is normal.      Breath sounds: Normal breath sounds. Abdominal:      General: Abdomen is flat. Bowel sounds are normal.      Palpations: Abdomen is soft. Tenderness:  There is no right CVA tenderness or left CVA tenderness. Negative signs include McBurney's sign. Hernia: No hernia is present. Comments: Some right-sided discomfort but no rebound or peritoneal findings. No palpable mass or organomegaly. Skin:     General: Skin is warm and dry. Neurological:      General: No focal deficit present. Mental Status: She is alert. Psychiatric:         Behavior: Behavior normal.          Procedures        Labs Reviewed   COMPREHENSIVE METABOLIC PANEL - Abnormal; Notable for the following components:       Result Value    Chloride 109 (*)     Glucose 111 (*)     AST 8 (*)     Globulin 3.8 (*)     Albumin/Globulin Ratio 0.9 (*)     All other components within normal limits   CBC WITH AUTO DIFFERENTIAL   LIPASE   POCT URINALYSIS DIPSTICK   POC PREGNANCY UR-QUAL        CT ABDOMEN PELVIS WO CONTRAST Additional Contrast? None   Final Result   1. No acute abnormality within the abdomen or pelvis. 2.  Cholecystectomy and hysterectomy. Voice dictation software was used during the making of this note. This software is not perfect and grammatical and other typographical errors may be present. This note has not been completely proofread for errors.       Casi Goode MD  06/17/22 4883

## 2022-07-06 ENCOUNTER — HOSPITAL ENCOUNTER (EMERGENCY)
Age: 32
Discharge: HOME | End: 2022-07-06
Attending: EMERGENCY MEDICINE
Payer: COMMERCIAL

## 2022-07-06 VITALS
BODY MASS INDEX: 27.66 KG/M2 | HEART RATE: 83 BPM | HEIGHT: 64 IN | TEMPERATURE: 98.5 F | DIASTOLIC BLOOD PRESSURE: 59 MMHG | OXYGEN SATURATION: 100 % | WEIGHT: 162 LBS | RESPIRATION RATE: 17 BRPM | SYSTOLIC BLOOD PRESSURE: 124 MMHG

## 2022-07-06 DIAGNOSIS — T78.40XA ALLERGIC REACTION, INITIAL ENCOUNTER: Primary | ICD-10-CM

## 2022-07-06 PROCEDURE — 96375 TX/PRO/DX INJ NEW DRUG ADDON: CPT

## 2022-07-06 PROCEDURE — A4216 STERILE WATER/SALINE, 10 ML: HCPCS | Performed by: PHYSICIAN ASSISTANT

## 2022-07-06 PROCEDURE — 6370000000 HC RX 637 (ALT 250 FOR IP): Performed by: PHYSICIAN ASSISTANT

## 2022-07-06 PROCEDURE — 2500000003 HC RX 250 WO HCPCS: Performed by: PHYSICIAN ASSISTANT

## 2022-07-06 PROCEDURE — 96374 THER/PROPH/DIAG INJ IV PUSH: CPT

## 2022-07-06 PROCEDURE — 6360000002 HC RX W HCPCS: Performed by: PHYSICIAN ASSISTANT

## 2022-07-06 PROCEDURE — 2580000003 HC RX 258: Performed by: PHYSICIAN ASSISTANT

## 2022-07-06 PROCEDURE — 99284 EMERGENCY DEPT VISIT MOD MDM: CPT

## 2022-07-06 RX ORDER — ALBUTEROL SULFATE 90 UG/1
2 AEROSOL, METERED RESPIRATORY (INHALATION) 4 TIMES DAILY PRN
Qty: 18 G | Refills: 5 | Status: SHIPPED | OUTPATIENT
Start: 2022-07-06

## 2022-07-06 RX ORDER — METHYLPREDNISOLONE SODIUM SUCCINATE 125 MG/2ML
125 INJECTION, POWDER, LYOPHILIZED, FOR SOLUTION INTRAMUSCULAR; INTRAVENOUS
Status: COMPLETED | OUTPATIENT
Start: 2022-07-06 | End: 2022-07-06

## 2022-07-06 RX ORDER — IPRATROPIUM BROMIDE AND ALBUTEROL SULFATE 2.5; .5 MG/3ML; MG/3ML
1 SOLUTION RESPIRATORY (INHALATION)
Status: COMPLETED | OUTPATIENT
Start: 2022-07-06 | End: 2022-07-06

## 2022-07-06 RX ORDER — PREDNISONE 20 MG/1
40 TABLET ORAL DAILY
Qty: 10 TABLET | Refills: 0 | Status: SHIPPED | OUTPATIENT
Start: 2022-07-06 | End: 2022-07-11 | Stop reason: ALTCHOICE

## 2022-07-06 RX ORDER — DIPHENHYDRAMINE HYDROCHLORIDE 50 MG/ML
25 INJECTION INTRAMUSCULAR; INTRAVENOUS
Status: COMPLETED | OUTPATIENT
Start: 2022-07-06 | End: 2022-07-06

## 2022-07-06 RX ADMIN — DIPHENHYDRAMINE HYDROCHLORIDE 25 MG: 50 INJECTION, SOLUTION INTRAMUSCULAR; INTRAVENOUS at 16:16

## 2022-07-06 RX ADMIN — FAMOTIDINE 20 MG: 10 INJECTION, SOLUTION INTRAVENOUS at 16:19

## 2022-07-06 RX ADMIN — METHYLPREDNISOLONE SODIUM SUCCINATE 125 MG: 125 INJECTION, POWDER, FOR SOLUTION INTRAMUSCULAR; INTRAVENOUS at 16:18

## 2022-07-06 RX ADMIN — IPRATROPIUM BROMIDE AND ALBUTEROL SULFATE 1 AMPULE: .5; 3 SOLUTION RESPIRATORY (INHALATION) at 17:32

## 2022-07-06 ASSESSMENT — ENCOUNTER SYMPTOMS
EYE REDNESS: 0
NAUSEA: 0
CHEST TIGHTNESS: 0
VOMITING: 0
RHINORRHEA: 0
SORE THROAT: 0
COUGH: 0
BACK PAIN: 0
SHORTNESS OF BREATH: 0
ABDOMINAL DISTENTION: 0
DIARRHEA: 0
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
## 2022-07-06 NOTE — ED TRIAGE NOTES
Pt presents to triage ambulatory, no distress noted, A&Ox4 cc allergic reaction to yellow jacket. Pt was stung yesterday morning about 1130 to the right side of her neck. Pt states she has taken benadryl and zyrtec today. Pt reports chest tightness, pt reports throat is sore, and feeling \"jittery\".

## 2022-07-06 NOTE — Clinical Note
Gonsalo Parkinson was seen and treated in our emergency department on 7/6/2022. She may return to work on 07/07/2022. If you have any questions or concerns, please don't hesitate to call.       Ish Anthony

## 2022-07-11 ENCOUNTER — OFFICE VISIT (OUTPATIENT)
Dept: INTERNAL MEDICINE CLINIC | Facility: CLINIC | Age: 32
End: 2022-07-11
Payer: COMMERCIAL

## 2022-07-11 VITALS
HEART RATE: 68 BPM | HEIGHT: 64 IN | OXYGEN SATURATION: 99 % | TEMPERATURE: 97 F | DIASTOLIC BLOOD PRESSURE: 60 MMHG | BODY MASS INDEX: 27.83 KG/M2 | SYSTOLIC BLOOD PRESSURE: 102 MMHG | WEIGHT: 163 LBS

## 2022-07-11 DIAGNOSIS — J02.9 PHARYNGITIS, UNSPECIFIED ETIOLOGY: Primary | ICD-10-CM

## 2022-07-11 DIAGNOSIS — F41.9 ANXIETY: ICD-10-CM

## 2022-07-11 DIAGNOSIS — T63.441D BEE STING, ACCIDENTAL OR UNINTENTIONAL, SUBSEQUENT ENCOUNTER: ICD-10-CM

## 2022-07-11 PROCEDURE — 99214 OFFICE O/P EST MOD 30 MIN: CPT | Performed by: FAMILY MEDICINE

## 2022-07-11 RX ORDER — CLINDAMYCIN HYDROCHLORIDE 300 MG/1
300 CAPSULE ORAL 3 TIMES DAILY
Qty: 30 CAPSULE | Refills: 0 | Status: SHIPPED | OUTPATIENT
Start: 2022-07-11 | End: 2022-07-21

## 2022-07-11 RX ORDER — ESCITALOPRAM OXALATE 10 MG/1
10 TABLET ORAL DAILY
Qty: 90 TABLET | Refills: 3 | Status: SHIPPED | OUTPATIENT
Start: 2022-07-11

## 2022-07-11 ASSESSMENT — PATIENT HEALTH QUESTIONNAIRE - PHQ9
1. LITTLE INTEREST OR PLEASURE IN DOING THINGS: 0
2. FEELING DOWN, DEPRESSED OR HOPELESS: 0
SUM OF ALL RESPONSES TO PHQ9 QUESTIONS 1 & 2: 0
SUM OF ALL RESPONSES TO PHQ QUESTIONS 1-9: 0

## 2022-07-11 NOTE — PROGRESS NOTES
Oliva Gudino DO  Diplomate of the [employer] of [address] [place] [address] Internal Medicine      Rolando Marlow (: 1990) is a 28 y.o. female, here for evaluation of the following chief complaint(s):  Follow-up (1 month )       ASSESSMENT/PLAN:  1. Pharyngitis, unspecified etiology  -     clindamycin (CLEOCIN) 300 MG capsule; Take 1 capsule by mouth 3 times daily for 10 days, Disp-30 capsule, R-0Normal  2. Anxiety  -     escitalopram (LEXAPRO) 10 MG tablet; Take 1 tablet by mouth daily, Disp-90 tablet, R-3Normal  3. Bee sting, accidental or unintentional, subsequent encounter     -Clindamycin as ordered (PCN) allergic.  -Instructed on the proper use of antibiotics. Also stressed the importance of taking the full course to help prevent the risk of resistance. Advised taking pro-biotics (yogurt, align or equivalent) while on antibiotics to reduce the risk of C. Diff infection.  -Tolerating medication well without adverse effects and providing good therapeutic benefit for anxiety. Will continue with lexapro and advised to let us know for any worsening symptoms.  -Continue with oral antihistamines for bee stings. SUBJECTIVE/OBJECTIVE:  HPI:  -Has been having increasing severe sore throat over the past several days. She recently had several bee stings on her neck and did not know if this could be related. She has been having painful swallowing, although she is able to swallow. No nausea or vomiting. No fevers. -Was in the ER due to several bee stings on her neck and ear. She is doing better after being on prednisone. Continues on antihistamines and starting to resolve. -Anxiety is doing very well on Lexapro. She feels like her moods are stable and she is tolerating it well without adverse effects. ROS negative except as noted above today. Social History     Tobacco Use    Smoking status: Never Smoker    Smokeless tobacco: Never Used   Substance Use Topics    Alcohol use:  No Alcohol/week: 0.0 standard drinks    Drug use: Yes     Types: Marijuana (Weed)     Vitals:    07/11/22 1605   BP: 102/60   Site: Left Upper Arm   Position: Sitting   Cuff Size: Large Adult   Pulse: 68   Temp: 97 °F (36.1 °C)   TempSrc: Temporal   SpO2: 99%   Weight: 163 lb (73.9 kg)   Height: 5' 4\" (1.626 m)      Body mass index is 27.98 kg/m². Physical Exam  Vitals reviewed. HENT:      Mouth/Throat:      Mouth: Mucous membranes are moist.      Pharynx: Pharyngeal swelling, oropharyngeal exudate and posterior oropharyngeal erythema present. Cardiovascular:      Rate and Rhythm: Normal rate and regular rhythm. Pulmonary:      Effort: Pulmonary effort is normal.      Breath sounds: Normal breath sounds. Skin:     General: Skin is warm and dry. Comments: Bee stings on neck starting to resolve, still with some mild erythematous patches, but clearing. Neurological:      Mental Status: She is alert. An electronic signature was used to authenticate this note.   Nacho Ped, DO

## 2022-07-15 ENCOUNTER — TELEPHONE (OUTPATIENT)
Dept: INTERNAL MEDICINE CLINIC | Facility: CLINIC | Age: 32
End: 2022-07-15

## 2022-07-15 NOTE — TELEPHONE ENCOUNTER
Patient states an at home test yesterday was negative. Patient notified it is likely viral she can do honey for cough, and zyrtec and/or nasal spray to help with all the drainage.

## 2022-07-15 NOTE — TELEPHONE ENCOUNTER
Patient called stating that you were treating her for strep, and she is not getting any better. She woke up yesterday with sinus issues, and feeling like she has a sinus infection. Patient states she is taking the antibiotic you prescribed, but does not feel any better. Please advise.

## 2022-07-26 RX ORDER — TRAZODONE HYDROCHLORIDE 100 MG/1
100 TABLET ORAL NIGHTLY
Qty: 90 TABLET | Refills: 1 | Status: SHIPPED | OUTPATIENT
Start: 2022-07-26

## 2022-08-12 ENCOUNTER — TELEMEDICINE (OUTPATIENT)
Dept: INTERNAL MEDICINE CLINIC | Facility: CLINIC | Age: 32
End: 2022-08-12
Payer: COMMERCIAL

## 2022-08-12 DIAGNOSIS — J40 BRONCHITIS: Primary | ICD-10-CM

## 2022-08-12 DIAGNOSIS — J45.41 MODERATE PERSISTENT ASTHMA WITH EXACERBATION: ICD-10-CM

## 2022-08-12 PROCEDURE — 99214 OFFICE O/P EST MOD 30 MIN: CPT | Performed by: FAMILY MEDICINE

## 2022-08-12 RX ORDER — METHYLPREDNISOLONE 4 MG/1
TABLET ORAL
Qty: 1 KIT | Refills: 0 | Status: SHIPPED | OUTPATIENT
Start: 2022-08-12 | End: 2022-08-18

## 2022-08-12 RX ORDER — DOXYCYCLINE HYCLATE 100 MG
100 TABLET ORAL 2 TIMES DAILY
Qty: 14 TABLET | Refills: 0 | Status: SHIPPED | OUTPATIENT
Start: 2022-08-12 | End: 2022-08-19

## 2022-08-12 ASSESSMENT — PATIENT HEALTH QUESTIONNAIRE - PHQ9
2. FEELING DOWN, DEPRESSED OR HOPELESS: 0
SUM OF ALL RESPONSES TO PHQ QUESTIONS 1-9: 0
SUM OF ALL RESPONSES TO PHQ9 QUESTIONS 1 & 2: 0
1. LITTLE INTEREST OR PLEASURE IN DOING THINGS: 0
SUM OF ALL RESPONSES TO PHQ QUESTIONS 1-9: 0

## 2022-08-12 NOTE — PROGRESS NOTES
Lo Antonio DO  Diplomate of the [employer] of [address] [place] [address] Internal Medicine      Ana Saxena (: 1990) is a 28 y.o. female, here for evaluation of the following chief complaint(s): Insomnia       ASSESSMENT/PLAN:  1. Bronchitis  -     doxycycline hyclate (VIBRA-TABS) 100 MG tablet; Take 1 tablet by mouth in the morning and 1 tablet before bedtime. Do all this for 7 days. , Disp-14 tablet, R-0Normal  2. Moderate persistent asthma with exacerbation  -     methylPREDNISolone (MEDROL DOSEPACK) 4 MG tablet; Take by mouth., Disp-1 kit, R-0Normal    -Guafenisin (Mucinex) to thin secretions  Acetaminophen (Tylenol) for fever. Instructed on the proper use of antibiotics. Also stressed the importance of taking the full course to help prevent the risk of resistance. Advised taking pro-biotics (yogurt, align or equivalent) while on antibiotics to reduce the risk of C. Diff infection.  -Continue with inhalers. --Instructed on how to take the steroids properly as well as potential side effects of the medication. Advised to let me know for any problems.  -If no better, allergy/pulmonary referral.    SUBJECTIVE/OBJECTIVE:  HPI:   -Patient is having virtual visit today. We had treated her about a month ago for strep pharyngitis. She states that she did improve. However, now, she is having increasing mucopurulent productive sputum as well as wheezing. She is having some shortness of breath as well. She has been using her Advair as directed as well as her albuterol. No fevers. Tolerating p.o. well. Physical Exam  Constitutional:       General: She is not in acute distress. Appearance: She is not ill-appearing. Neurological:      Mental Status: She is alert.           Ana Saxena, who was evaluated through a synchronous (real-time) audio-video encounter, and/or her healthcare decision maker, is aware that it is a billable service, which includes applicable co-pays, with coverage as determined by her insurance carrier. She provided verbal consent to proceed: yes, and patient identification was verified. This visit was conducted pursuant to the emergency declaration under the [address] [place] [address] authority and the [employer] Act. A caregiver was present when appropriate. Ability to conduct physical exam was limited. The patient was located in a state where the provider was licensed to provide care.      --Bel Miles DO on 8/12/2022 at 1:01 PM

## 2022-08-25 ENCOUNTER — TELEPHONE (OUTPATIENT)
Dept: INTERNAL MEDICINE CLINIC | Facility: CLINIC | Age: 32
End: 2022-08-25

## 2022-08-25 DIAGNOSIS — J32.9 CHRONIC SINUSITIS, UNSPECIFIED LOCATION: Primary | ICD-10-CM

## 2022-08-25 NOTE — TELEPHONE ENCOUNTER
----- Message from Jaja Murray sent at 8/25/2022 11:24 AM EDT -----  Subject: Referral Request    Reason for referral request? Allergist- sinus issues  Provider patient wants to be referred to(if known):     Provider Phone Number(if known): Additional Information for Provider? Patient states that Dr. Feroz Kirby   told her if she got sick again to call and request a referral for the   allergist patient is currently sick with sinus issues.  Please call patient   to advise.  ---------------------------------------------------------------------------  --------------  Monique Chilel INFO    [phone]; OK to leave message on voicemail  ---------------------------------------------------------------------------  --------------

## 2022-10-12 ENCOUNTER — HOSPITAL ENCOUNTER (EMERGENCY)
Age: 32
Discharge: HOME | End: 2022-10-12
Attending: EMERGENCY MEDICINE

## 2022-10-12 VITALS
DIASTOLIC BLOOD PRESSURE: 70 MMHG | WEIGHT: 170 LBS | SYSTOLIC BLOOD PRESSURE: 106 MMHG | HEART RATE: 70 BPM | OXYGEN SATURATION: 98 % | RESPIRATION RATE: 16 BRPM | HEIGHT: 64 IN | BODY MASS INDEX: 29.02 KG/M2 | TEMPERATURE: 97.8 F

## 2022-10-12 DIAGNOSIS — E86.1 HYPOVOLEMIA: ICD-10-CM

## 2022-10-12 DIAGNOSIS — E86.0 DEHYDRATION: ICD-10-CM

## 2022-10-12 DIAGNOSIS — R10.84 GENERALIZED ABDOMINAL PAIN: ICD-10-CM

## 2022-10-12 DIAGNOSIS — R11.2 NAUSEA AND VOMITING, UNSPECIFIED VOMITING TYPE: Primary | ICD-10-CM

## 2022-10-12 DIAGNOSIS — R19.7 DIARRHEA, UNSPECIFIED TYPE: ICD-10-CM

## 2022-10-12 LAB
ALBUMIN SERPL-MCNC: 3.5 G/DL (ref 3.5–5)
ALBUMIN/GLOB SERPL: 0.9 {RATIO} (ref 0.4–1.6)
ALP SERPL-CCNC: 79 U/L (ref 50–136)
ALT SERPL-CCNC: 21 U/L (ref 12–65)
ANION GAP SERPL CALC-SCNC: 3 MMOL/L (ref 2–11)
AST SERPL-CCNC: 11 U/L (ref 15–37)
BACTERIA SPEC CULT: NORMAL
BASOPHILS # BLD: 0.1 K/UL (ref 0–0.2)
BASOPHILS NFR BLD: 1 % (ref 0–2)
BILIRUB SERPL-MCNC: 0.3 MG/DL (ref 0.2–1.1)
BILIRUB UR QL: NEGATIVE
BUN SERPL-MCNC: 5 MG/DL (ref 6–23)
CALCIUM SERPL-MCNC: 9 MG/DL (ref 8.3–10.4)
CHLORIDE SERPL-SCNC: 110 MMOL/L (ref 101–110)
CO2 SERPL-SCNC: 24 MMOL/L (ref 21–32)
CREAT SERPL-MCNC: 0.9 MG/DL (ref 0.6–1)
DIFFERENTIAL METHOD BLD: NORMAL
EOSINOPHIL # BLD: 0.1 K/UL (ref 0–0.8)
EOSINOPHIL NFR BLD: 2 % (ref 0.5–7.8)
ERYTHROCYTE [DISTWIDTH] IN BLOOD BY AUTOMATED COUNT: 12.5 % (ref 11.9–14.6)
GLOBULIN SER CALC-MCNC: 3.7 G/DL (ref 2.8–4.5)
GLUCOSE SERPL-MCNC: 96 MG/DL (ref 65–100)
GLUCOSE UR QL STRIP.AUTO: NEGATIVE MG/DL
HCG UR QL: NEGATIVE
HCT VFR BLD AUTO: 42.7 % (ref 35.8–46.3)
HGB BLD-MCNC: 13.9 G/DL (ref 11.7–15.4)
IMM GRANULOCYTES # BLD AUTO: 0 K/UL (ref 0–0.5)
IMM GRANULOCYTES NFR BLD AUTO: 0 % (ref 0–5)
KETONES UR-MCNC: NEGATIVE MG/DL
LEUKOCYTE ESTERASE UR QL STRIP: NEGATIVE
LIPASE SERPL-CCNC: 90 U/L (ref 73–393)
LYMPHOCYTES # BLD: 2.5 K/UL (ref 0.5–4.6)
LYMPHOCYTES NFR BLD: 33 % (ref 13–44)
MCH RBC QN AUTO: 29.1 PG (ref 26.1–32.9)
MCHC RBC AUTO-ENTMCNC: 32.6 G/DL (ref 31.4–35)
MCV RBC AUTO: 89.5 FL (ref 82–102)
MONOCYTES # BLD: 0.4 K/UL (ref 0.1–1.3)
MONOCYTES NFR BLD: 5 % (ref 4–12)
NEUTS SEG # BLD: 4.4 K/UL (ref 1.7–8.2)
NEUTS SEG NFR BLD: 59 % (ref 43–78)
NITRITE UR QL: NEGATIVE
NRBC # BLD: 0 K/UL (ref 0–0.2)
PH UR: 7 [PH] (ref 5–9)
PLATELET # BLD AUTO: 295 K/UL (ref 150–450)
PMV BLD AUTO: 10.8 FL (ref 9.4–12.3)
POTASSIUM SERPL-SCNC: 4.1 MMOL/L (ref 3.5–5.1)
PROT SERPL-MCNC: 7.2 G/DL (ref 6.3–8.2)
PROT UR QL: NEGATIVE MG/DL
RBC # BLD AUTO: 4.77 M/UL (ref 4.05–5.2)
RBC # UR STRIP: NEGATIVE /UL
SERVICE CMNT-IMP: ABNORMAL
SERVICE CMNT-IMP: NORMAL
SODIUM SERPL-SCNC: 137 MMOL/L (ref 133–143)
SP GR UR: 1.02 (ref 1–1.02)
UROBILINOGEN UR QL: 0.2 EU/DL (ref 0.2–1)
WBC # BLD AUTO: 7.5 K/UL (ref 4.3–11.1)

## 2022-10-12 PROCEDURE — 96374 THER/PROPH/DIAG INJ IV PUSH: CPT

## 2022-10-12 PROCEDURE — 87177 OVA AND PARASITES SMEARS: CPT

## 2022-10-12 PROCEDURE — 87493 C DIFF AMPLIFIED PROBE: CPT

## 2022-10-12 PROCEDURE — 80053 COMPREHEN METABOLIC PANEL: CPT

## 2022-10-12 PROCEDURE — 6360000002 HC RX W HCPCS: Performed by: EMERGENCY MEDICINE

## 2022-10-12 PROCEDURE — 96361 HYDRATE IV INFUSION ADD-ON: CPT

## 2022-10-12 PROCEDURE — 6370000000 HC RX 637 (ALT 250 FOR IP): Performed by: EMERGENCY MEDICINE

## 2022-10-12 PROCEDURE — 96375 TX/PRO/DX INJ NEW DRUG ADDON: CPT

## 2022-10-12 PROCEDURE — 81025 URINE PREGNANCY TEST: CPT

## 2022-10-12 PROCEDURE — 83690 ASSAY OF LIPASE: CPT

## 2022-10-12 PROCEDURE — 2580000003 HC RX 258: Performed by: EMERGENCY MEDICINE

## 2022-10-12 PROCEDURE — 83631 LACTOFERRIN FECAL (QUANT): CPT

## 2022-10-12 PROCEDURE — 81003 URINALYSIS AUTO W/O SCOPE: CPT

## 2022-10-12 PROCEDURE — 85025 COMPLETE CBC W/AUTO DIFF WBC: CPT

## 2022-10-12 PROCEDURE — 99284 EMERGENCY DEPT VISIT MOD MDM: CPT

## 2022-10-12 RX ORDER — LOPERAMIDE HYDROCHLORIDE 2 MG/1
2 CAPSULE ORAL 4 TIMES DAILY PRN
Qty: 20 CAPSULE | Refills: 0 | Status: SHIPPED | OUTPATIENT
Start: 2022-10-12 | End: 2022-10-22

## 2022-10-12 RX ORDER — SODIUM CHLORIDE, SODIUM LACTATE, POTASSIUM CHLORIDE, AND CALCIUM CHLORIDE .6; .31; .03; .02 G/100ML; G/100ML; G/100ML; G/100ML
1000 INJECTION, SOLUTION INTRAVENOUS
Status: COMPLETED | OUTPATIENT
Start: 2022-10-12 | End: 2022-10-12

## 2022-10-12 RX ORDER — METRONIDAZOLE 500 MG/1
500 TABLET ORAL 3 TIMES DAILY
Qty: 21 TABLET | Refills: 0 | Status: SHIPPED | OUTPATIENT
Start: 2022-10-12

## 2022-10-12 RX ORDER — METOCLOPRAMIDE HYDROCHLORIDE 5 MG/ML
10 INJECTION INTRAMUSCULAR; INTRAVENOUS
Status: COMPLETED | OUTPATIENT
Start: 2022-10-12 | End: 2022-10-12

## 2022-10-12 RX ORDER — HYOSCYAMINE SULFATE 0.12 MG/1
0.25 TABLET SUBLINGUAL 4 TIMES DAILY PRN
Qty: 20 EACH | Refills: 1 | Status: SHIPPED | OUTPATIENT
Start: 2022-10-12

## 2022-10-12 RX ORDER — PROCHLORPERAZINE MALEATE 10 MG
10 TABLET ORAL EVERY 6 HOURS PRN
Qty: 12 TABLET | Refills: 0 | Status: SHIPPED | OUTPATIENT
Start: 2022-10-12

## 2022-10-12 RX ORDER — PROCHLORPERAZINE EDISYLATE 5 MG/ML
10 INJECTION INTRAMUSCULAR; INTRAVENOUS
Status: COMPLETED | OUTPATIENT
Start: 2022-10-12 | End: 2022-10-12

## 2022-10-12 RX ADMIN — PROCHLORPERAZINE EDISYLATE 10 MG: 5 INJECTION INTRAMUSCULAR; INTRAVENOUS at 13:10

## 2022-10-12 RX ADMIN — HYOSCYAMINE SULFATE 250 MCG: 0.12 TABLET ORAL; SUBLINGUAL at 17:16

## 2022-10-12 RX ADMIN — METOCLOPRAMIDE 10 MG: 5 INJECTION, SOLUTION INTRAMUSCULAR; INTRAVENOUS at 13:50

## 2022-10-12 RX ADMIN — SODIUM CHLORIDE, POTASSIUM CHLORIDE, SODIUM LACTATE AND CALCIUM CHLORIDE 1000 ML: 600; 310; 30; 20 INJECTION, SOLUTION INTRAVENOUS at 13:10

## 2022-10-12 ASSESSMENT — ENCOUNTER SYMPTOMS
ABDOMINAL PAIN: 1
RHINORRHEA: 0
SHORTNESS OF BREATH: 0
NAUSEA: 1
COUGH: 0
FACIAL SWELLING: 0
EYE DISCHARGE: 0
COLOR CHANGE: 0
BACK PAIN: 0
DIARRHEA: 1
VOMITING: 1
EYE REDNESS: 0

## 2022-10-12 ASSESSMENT — PAIN DESCRIPTION - DESCRIPTORS: DESCRIPTORS: CRAMPING

## 2022-10-12 ASSESSMENT — PAIN DESCRIPTION - LOCATION: LOCATION: ABDOMEN

## 2022-10-12 ASSESSMENT — PAIN SCALES - GENERAL: PAINLEVEL_OUTOF10: 6

## 2022-10-12 ASSESSMENT — PAIN - FUNCTIONAL ASSESSMENT: PAIN_FUNCTIONAL_ASSESSMENT: 0-10

## 2022-10-12 NOTE — Clinical Note
Alex Cerrato was seen and treated in our emergency department on 10/12/2022. She may return to work on 10/14/2022. Please excuse October 12 and 13 as needed     If you have any questions or concerns, please don't hesitate to call.       Simon Menchaca MD

## 2022-10-12 NOTE — DISCHARGE INSTRUCTIONS
I will call you this evening with your test results, you can also look them up on \"my chart\". If C. difficile is positive - discard the Lomotil prescription, discontinue Imodium, and use the Flagyl 3 times a day for 2 weeks.     If C. difficile is negative - discard the Flagyl prescription, and use either the Imodium or the material to slow down the diarrhea    Levsin as needed for pain, and Compazine as needed for nausea

## 2022-10-12 NOTE — DISCHARGE SUMMARY
I have reviewed discharge instructions with the patient. The patient verbalized understanding. Patient left ED via Discharge Method: ambulatory to Home with family. Opportunity for questions and clarification provided. Patient given 4 scripts. To continue your aftercare when you leave the hospital, you may receive an automated call from our care team to check in on how you are doing. This is a free service and part of our promise to provide the best care and service to meet your aftercare needs.  If you have questions, or wish to unsubscribe from this service please call [phone]. Thank you for Choosing our [facility] Emergency Department.

## 2022-10-12 NOTE — ED PROVIDER NOTES
[facility] Emergency Department Provider Note                   PCP:                Krys Sanchez DO               Age: 33 y.o. Sex: female     No diagnosis found. DISPOSITION         New Prescriptions    No medications on file       Orders Placed This Encounter   Procedures    Clostridium Difficile Toxin/Antigen    Ova and Parasite Examination    CBC with Diff    CMP    Lipase    Fecal lactoferrin    Diet NPO    POCT Urine Dipstick    Misc nursing order (specify)    Misc nursing order (specify)    POCT Urinalysis no Micro    POC Pregnancy Urine Qual    Saline lock IV         Elda Chacon is a 33 y.o. female who presents to the Emergency Department with chief complaint of    Chief Complaint   Patient presents with    Emesis      Chief complaint : Diarrhea and vomiting    HISTORY OF PRESENT ILLNESS :  Location : Diffuse abdominal pain    Quality : Crampy pain    Quantity : Diarrhea maybe 4 times a day, every time she eats, for close to a month    Timing : Vomiting just started Monday, 3 days ago    Severity : Moderate    Context : No work-up for diarrhea,    Alleviating / exacerbating factors : Diarrhea does improve with Imodium    Associated Symptoms : No fevers or chills    -------------------------------  SURGICAL HISTORY : Cholecystectomy and total hysterectomy     MEDICAL HISTORY : No diabetes    SOCIAL HISTORY : single, non-smoker, nondrinker        Review of Systems   Constitutional:  Positive for activity change and appetite change. Negative for chills and fever. HENT:  Negative for facial swelling and rhinorrhea. Eyes:  Negative for discharge and redness. Respiratory:  Negative for cough and shortness of breath. Cardiovascular:  Negative for chest pain and palpitations. Gastrointestinal:  Positive for abdominal pain, diarrhea, nausea and vomiting. Endocrine: Negative for polydipsia and polyuria. Genitourinary:  Negative for difficulty urinating and dysuria.    Musculoskeletal: Negative for arthralgias and back pain. Skin:  Negative for color change and pallor. Neurological:  Positive for dizziness and light-headedness. Negative for headaches. All other systems reviewed and are negative. All other systems reviewed and are negative. 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 last seen by hematology 2016.  No issues since hysterectomy in 2016    Cancer [facility])     acute intermittent porphyria    Diarrhea     Ear infection     Easy bruisability     Endometriosis     s/p hysterectomy    Frequent common colds     H/O seasonal allergies     Headache     Heavy menses     History of seizures 1/30/2019    History of UTI     Irregular heart beat     hx. no murmur    Kidney stones 2017    Nausea & vomiting     Other ill-defined conditions(799.89)     pt states allergy to Port-a-Cath    Ovarian cyst 2004    Poor sleep     Ringing in ears     Se[facility]ure [address]     most recent 3/20/16 - due to recent AIP attack     Seizure disorder ([facility] 1/30/2019        Past Surgical History:   Procedure Laterality Date    CHOLECYSTECTOMY, LAPAROSCOPIC  2005    HYSTERECTOMY (CERVIX STATUS UNKNOWN)      Rt ovary intact    OTHER SURGICAL HISTORY  Oct 2015 (removal)    port placement and removal    OVARIAN CYST REMOVAL  2004    SALPINGO-OOPHORECTOMY  01/2019        Family History   Problem Relation Age of Onset    Diabetes Father     Hypertension Father     Heart Attack Father     Cancer Sister         cervical cancer    Other Sister         Aneurysm    Ovarian Cancer Sister         half sister    Diabetes Mother     Hypertension Mother     Cancer Mother         cervical cancer and kidney cancer    Diabetes Sister     Diabetes Brother     Ovarian Cancer Sister         Social Connections: Not on file        Allergies   Allergen Reactions    Amoxicillin Other (See Comments)    Hydrocodone-Acetaminophen Other (See Comments)    Meperidine      Other reaction(s): Abdominal pain-Intolerance  seizures    Iodine Rash    Adhesive Tape      Other reaction(s): Contact Dermatitis  Dermabond    Ondansetron Hcl      Other reaction(s): Abdominal pain-Intolerance    Ciprofloxacin Rash     Arm, fingers, stomach    Sulfa Antibiotics Rash     fever        Vitals signs and nursing note reviewed. Patient Vitals for the past 4 hrs:   Temp Pulse Resp BP SpO2   10/12/22 1351 97.8 °F (36.6 °C) 77 16 106/70 98 %   10/12/22 1247 98.4 °F (36.9 °C) 70 16 102/62 99 %   10/12/22 1112 98.3 °F (36.8 °C) 71 18 117/62 99 %          Physical Exam  Vitals and nursing note reviewed. Constitutional:       General: She is not in acute distress. Appearance: Normal appearance. She is not ill-appearing, toxic-appearing or diaphoretic. HENT:      Head: Normocephalic and atraumatic. Right Ear: External ear normal.      Left Ear: External ear normal.      Nose: No rhinorrhea. Eyes:      General: No scleral icterus. Right eye: No discharge. Left eye: No discharge. Conjunctiva/sclera: Conjunctivae normal.   Cardiovascular:      Rate and Rhythm: Normal rate and regular rhythm. Pulmonary:      Effort: Pulmonary effort is normal. No respiratory distress. Breath sounds: Normal breath sounds. Abdominal:      General: Abdomen is flat. There is no distension. Palpations: Abdomen is soft. There is no fluid wave or pulsatile mass. Tenderness: There is no abdominal tenderness. There is no right CVA tenderness, left CVA tenderness, guarding or rebound. Musculoskeletal:         General: No deformity or signs of injury. Normal range of motion. Cervical back: Normal range of motion and neck supple. No rigidity. Skin:     General: Skin is warm and dry. Coloration: Skin is not jaundiced or pale. Neurological:      General: No focal deficit present.       Mental Status: She is alert and oriented to person, place, and time. Psychiatric:         Mood and Affect: Mood normal.         Behavior: Behavior normal.         Thought Content: Thought content normal.        MDM  Number of Diagnoses or Management Options  Dehydration  Diarrhea, unspecified type  Generalized abdominal pain  Hypovolemia  Nausea and vomiting, unspecified vomiting type  Diagnosis management comments: Nausea vomiting and diarrhea, labs reassuring, feels better after Zofran, will give some Reglan and a stool stimulating snack in the hopes of procuring a stool specimen  Dissipate home with Zofran for nausea Levsin for cramps and either Flagyl or Lomotil, depending on C. difficile results       Amount and/or Complexity of Data Reviewed  Clinical lab tests: reviewed and ordered  Decide to obtain previous medical records or to obtain history from someone other than the patient: yes  Obtain history from someone other than the patient: yes (Family at bedside)    Risk of Complications, Morbidity, and/or Mortality  Presenting problems: moderate  Diagnostic procedures: low  Management options: low    Patient Progress  Patient progress: improved      Procedures    Labs Reviewed   COMPREHENSIVE METABOLIC PANEL - Abnormal; Notable for the following components:       Result Value    BUN 5 (*)     AST 11 (*)     All other components within normal limits   POCT URINALYSIS DIPSTICK - Abnormal; Notable for the following components:    Specific Gravity, Urine, POC 1.025 (*)     All other components within normal limits   C DIFF TOXIN/ANTIGEN   OVA AND PARASITE EXAMINATION   CBC WITH AUTO DIFFERENTIAL   LIPASE   FECAL LACTOFERRIN   POC PREGNANCY UR-QUAL        No orders to display            Jw Coma Scale  Eye Opening: Spontaneous  Best Verbal Response: Oriented  Best Motor Response: Obeys commands  [place] Coma Scale Score: 15                     Voice dictation software was used during the making of this note.   This software is not perfect and grammatical and other typographical errors may be present. This note has not been completely proofread for errors.        Martina Anderson MD  10/12/22 1513       Martina Anderson MD  10/12/22 1707

## 2022-10-12 NOTE — Clinical Note
Macho Riggs was seen and treated in our emergency department on 10/12/2022. She may return to work on 10/14/2022. Please excuse October 12 and 13 as needed     If you have any questions or concerns, please don't hesitate to call.       Wesley Stafford MD

## 2022-10-18 LAB
LACTOFERRIN, FECAL: <1 UG/ML(G) (ref 0–7.24)
O+P STL CONC: NORMAL
O+P STL MICRO: NORMAL

## 2022-11-08 ENCOUNTER — OFFICE VISIT (OUTPATIENT)
Dept: INTERNAL MEDICINE CLINIC | Facility: CLINIC | Age: 32
End: 2022-11-08

## 2022-11-08 VITALS
BODY MASS INDEX: 29.19 KG/M2 | WEIGHT: 171 LBS | HEART RATE: 76 BPM | DIASTOLIC BLOOD PRESSURE: 70 MMHG | SYSTOLIC BLOOD PRESSURE: 122 MMHG | OXYGEN SATURATION: 99 % | HEIGHT: 64 IN

## 2022-11-08 DIAGNOSIS — J00 NASOPHARYNGITIS: Primary | ICD-10-CM

## 2022-11-08 DIAGNOSIS — R52 GENERALIZED BODY ACHES: ICD-10-CM

## 2022-11-08 DIAGNOSIS — J02.9 SORE THROAT: ICD-10-CM

## 2022-11-08 LAB
GROUP A STREP ANTIGEN, POC: NEGATIVE
QUICKVUE INFLUENZA TEST: NEGATIVE
VALID INTERNAL CONTROL, POC: YES
VALID INTERNAL CONTROL, POC: YES

## 2022-11-08 PROCEDURE — 87880 STREP A ASSAY W/OPTIC: CPT | Performed by: NURSE PRACTITIONER

## 2022-11-08 PROCEDURE — 99213 OFFICE O/P EST LOW 20 MIN: CPT | Performed by: NURSE PRACTITIONER

## 2022-11-08 PROCEDURE — 87804 INFLUENZA ASSAY W/OPTIC: CPT | Performed by: NURSE PRACTITIONER

## 2022-11-08 RX ORDER — LIDOCAINE HYDROCHLORIDE 20 MG/ML
SOLUTION OROPHARYNGEAL
Qty: 100 ML | Refills: 0 | Status: SHIPPED | OUTPATIENT
Start: 2022-11-08

## 2022-11-08 RX ORDER — BENZONATATE 100 MG/1
100-200 CAPSULE ORAL 3 TIMES DAILY PRN
Qty: 60 CAPSULE | Refills: 0 | Status: SHIPPED | OUTPATIENT
Start: 2022-11-08 | End: 2022-11-15

## 2022-11-08 ASSESSMENT — PATIENT HEALTH QUESTIONNAIRE - PHQ9
1. LITTLE INTEREST OR PLEASURE IN DOING THINGS: 0
SUM OF ALL RESPONSES TO PHQ QUESTIONS 1-9: 0
SUM OF ALL RESPONSES TO PHQ9 QUESTIONS 1 & 2: 0
SUM OF ALL RESPONSES TO PHQ QUESTIONS 1-9: 0
2. FEELING DOWN, DEPRESSED OR HOPELESS: 0

## 2022-11-08 ASSESSMENT — ENCOUNTER SYMPTOMS
CHANGE IN BOWEL HABIT: 1
SWOLLEN GLANDS: 0
SORE THROAT: 1
NAUSEA: 1
COUGH: 1
ABDOMINAL PAIN: 0
VOMITING: 0

## 2022-11-08 NOTE — PROGRESS NOTES
Micki Tinoco (:  1990) is a 33 y.o. female,Established patient, here for evaluation of the following chief complaint(s):  Generalized Body Aches, Pharyngitis, and Otalgia         ASSESSMENT/PLAN:  1. Nasopharyngitis  2. Generalized body aches  -     AMB POC RAPID INFLUENZA TEST  3. Sore throat  -     AMB POC RAPID STREP A      Return if symptoms worsen or fail to improve. Patient with sore throat  Negative strep  Body aches, sick, afebrile  She had negative covid test yesterday and negative flu today  Recommend she retest covid tomorrow  Recommend symptomatic treatment, lidocaine for sore throat, tessalon for cough  Hydrate well  F/u if worsening or no improvement    Subjective   SUBJECTIVE/OBJECTIVE:  Patient woke up yesterday with nausea and feeling badly. This AM her throat and ears were sore and body aches. In the last 2-3 hours she has a headache as well. No fever. Generalized Body Aches  This is a new problem. The current episode started yesterday. The problem has been gradually worsening. Associated symptoms include arthralgias, a change in bowel habit (week before last had diarrhea), coughing, fatigue, headaches, myalgias, nausea and a sore throat. Pertinent negatives include no abdominal pain, anorexia, chest pain, chills, congestion, diaphoresis, neck pain, numbness, rash, swollen glands, urinary symptoms, vomiting or weakness. Pharyngitis  Associated symptoms include arthralgias, a change in bowel habit (week before last had diarrhea), coughing, fatigue, headaches, myalgias, nausea and a sore throat. Pertinent negatives include no abdominal pain, anorexia, chest pain, chills, congestion, diaphoresis, neck pain, numbness, rash, swollen glands, urinary symptoms, vomiting or weakness. Otalgia   Associated symptoms include coughing, headaches and a sore throat. Pertinent negatives include no abdominal pain, neck pain, rash or vomiting.      Review of Systems   Constitutional: Positive for fatigue. Negative for chills and diaphoresis. HENT:  Positive for ear pain and sore throat. Negative for congestion. Respiratory:  Positive for cough. Cardiovascular:  Negative for chest pain. Gastrointestinal:  Positive for change in bowel habit (week before last had diarrhea) and nausea. Negative for abdominal pain, anorexia and vomiting. Musculoskeletal:  Positive for arthralgias and myalgias. Negative for neck pain. Skin:  Negative for rash. Neurological:  Positive for headaches. Negative for weakness and numbness. Objective   Physical Exam  Constitutional:       Appearance: Normal appearance. HENT:      Head: Normocephalic. Right Ear: External ear normal.      Left Ear: External ear normal.      Mouth/Throat:      Mouth: Mucous membranes are moist.      Pharynx: Posterior oropharyngeal erythema present. No oropharyngeal exudate. Eyes:      Extraocular Movements: Extraocular movements intact. Pupils: Pupils are equal, round, and reactive to light. Cardiovascular:      Rate and Rhythm: Normal rate and regular rhythm. Pulmonary:      Effort: Pulmonary effort is normal.      Breath sounds: Normal breath sounds. Abdominal:      Palpations: Abdomen is soft. Musculoskeletal:      Cervical back: Neck supple. Neurological:      General: No focal deficit present. Mental Status: She is alert and oriented to person, place, and time. Psychiatric:         Mood and Affect: Mood normal.         Behavior: Behavior normal.                An electronic signature was used to authenticate this note.     --EMILIE Daigle - CNP

## 2022-11-08 NOTE — LETTER
[facility]  [address]  [facility] [place] [address]  Phone: [phone]  Fax: [phone]    EMILIE Cruz CNP        November 8, 2022     Patient: Nick Griggs   YOB: 1990   Date of Visit: 11/8/2022       To Whom It May Concern: It is my medical opinion that Tamar Smith may return to work on 11/10/22. If you have any questions or concerns, please don't hesitate to call.     Sincerely,        EMILIE Cruz CNP

## 2022-11-10 ENCOUNTER — PATIENT MESSAGE (OUTPATIENT)
Dept: INTERNAL MEDICINE CLINIC | Facility: CLINIC | Age: 32
End: 2022-11-10

## 2022-11-10 RX ORDER — PREDNISONE 10 MG/1
TABLET ORAL
Qty: 1 EACH | Refills: 0 | Status: SHIPPED | OUTPATIENT
Start: 2022-11-10

## 2022-11-10 NOTE — TELEPHONE ENCOUNTER
From: Yadira Stone  To: Ana Garcia  Sent: 11/10/2022 7:50 AM EST  Subject: Chest tightness     I have developed a tightness in my chest and pain. I'm not having to use my inhaler more yet. I feel worse then I did at the office.

## 2023-02-01 RX ORDER — TRAZODONE HYDROCHLORIDE 100 MG/1
100 TABLET ORAL NIGHTLY
Qty: 90 TABLET | Refills: 1 | Status: SHIPPED | OUTPATIENT
Start: 2023-02-01

## 2023-06-05 RX ORDER — ESTRADIOL 1 MG/1
TABLET ORAL
Qty: 90 TABLET | Refills: 0 | OUTPATIENT
Start: 2023-06-05

## 2023-06-07 ENCOUNTER — PATIENT MESSAGE (OUTPATIENT)
Dept: GYNECOLOGY | Age: 33
End: 2023-06-07

## 2023-06-07 RX ORDER — ESTRADIOL 1 MG/1
1 TABLET ORAL DAILY
Qty: 30 TABLET | Refills: 0 | Status: SHIPPED | OUTPATIENT
Start: 2023-06-07

## 2023-06-07 NOTE — TELEPHONE ENCOUNTER
From: Janelle Rodney  To: Dr. Mick Malik: 2023 8:16 AM EDT  Subject: Medicine refill    My last refill  on my medicine I was wondering if I could a prescription until I can come in for an appointment

## 2023-07-10 NOTE — PROGRESS NOTES
SUNDAR Rodríguez is a 33 y.o. female seen for annual GYN exam.  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 Current Outpatient Medications   Medication Sig    estradiol (ESTRACE) 1 MG tablet Take 1 tablet by mouth daily    ondansetron (ZOFRAN-ODT) 4 MG disintegrating tablet Take 1 tablet by mouth 3 times daily as needed for Nausea or Vomiting    traZODone (DESYREL) 100 MG tablet Take 1 tablet by mouth nightly    albuterol sulfate HFA (VENTOLIN HFA) 108 (90 Base) MCG/ACT inhaler Inhale 2 puffs into the lungs 4 times daily as needed for Wheezing    fluticasone (FLONASE) 50 MCG/ACT nasal spray 2 sprays by Nasal route daily    fluticasone-salmeterol (ADVAIR) 250-50 MCG/ACT AEPB diskus inhaler Inhale 1 puff into the lungs in the morning and 1 puff in the evening. SUMAtriptan (IMITREX) 100 MG tablet 1 at onset of migraine, repeat in 2 hours if needed Indications: a migraine headache     No current facility-administered medications for this visit. 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 last seen by hematology 2016.  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

## 2023-07-12 ENCOUNTER — OFFICE VISIT (OUTPATIENT)
Dept: OBGYN CLINIC | Age: 33
End: 2023-07-12
Payer: COMMERCIAL

## 2023-07-12 VITALS
BODY MASS INDEX: 29.66 KG/M2 | WEIGHT: 178 LBS | HEIGHT: 65 IN | DIASTOLIC BLOOD PRESSURE: 86 MMHG | SYSTOLIC BLOOD PRESSURE: 138 MMHG

## 2023-07-12 DIAGNOSIS — Z78.0 MENOPAUSE: ICD-10-CM

## 2023-07-12 DIAGNOSIS — Z01.419 WELL WOMAN EXAM: Primary | ICD-10-CM

## 2023-07-12 PROCEDURE — 99395 PREV VISIT EST AGE 18-39: CPT | Performed by: OBSTETRICS & GYNECOLOGY

## 2023-07-12 RX ORDER — ESTRADIOL 1 MG/1
1 TABLET ORAL DAILY
Qty: 90 TABLET | Refills: 3 | Status: SHIPPED | OUTPATIENT
Start: 2023-07-12

## 2023-08-18 ENCOUNTER — OFFICE VISIT (OUTPATIENT)
Dept: INTERNAL MEDICINE CLINIC | Facility: CLINIC | Age: 33
End: 2023-08-18
Payer: COMMERCIAL

## 2023-08-18 VITALS
HEART RATE: 76 BPM | BODY MASS INDEX: 29.82 KG/M2 | HEIGHT: 65 IN | SYSTOLIC BLOOD PRESSURE: 130 MMHG | DIASTOLIC BLOOD PRESSURE: 70 MMHG | RESPIRATION RATE: 16 BRPM | OXYGEN SATURATION: 97 % | WEIGHT: 179 LBS

## 2023-08-18 DIAGNOSIS — R53.83 OTHER FATIGUE: ICD-10-CM

## 2023-08-18 DIAGNOSIS — F51.04 PSYCHOPHYSIOLOGICAL INSOMNIA: ICD-10-CM

## 2023-08-18 DIAGNOSIS — H65.01 NON-RECURRENT ACUTE SEROUS OTITIS MEDIA OF RIGHT EAR: ICD-10-CM

## 2023-08-18 DIAGNOSIS — R11.2 NAUSEA AND VOMITING, UNSPECIFIED VOMITING TYPE: Primary | ICD-10-CM

## 2023-08-18 DIAGNOSIS — R19.7 DIARRHEA, UNSPECIFIED TYPE: ICD-10-CM

## 2023-08-18 LAB
EXP DATE SOLUTION: NORMAL
EXP DATE SWAB: NORMAL
EXPIRATION DATE: NORMAL
LOT NUMBER POC: NORMAL
LOT NUMBER SOLUTION: NORMAL
LOT NUMBER SWAB: NORMAL
QUICKVUE INFLUENZA TEST: NEGATIVE
SARS-COV-2 RNA, POC: NEGATIVE
VALID INTERNAL CONTROL, POC: YES

## 2023-08-18 PROCEDURE — 87635 SARS-COV-2 COVID-19 AMP PRB: CPT | Performed by: FAMILY MEDICINE

## 2023-08-18 PROCEDURE — 99213 OFFICE O/P EST LOW 20 MIN: CPT | Performed by: FAMILY MEDICINE

## 2023-08-18 PROCEDURE — 87804 INFLUENZA ASSAY W/OPTIC: CPT | Performed by: FAMILY MEDICINE

## 2023-08-18 RX ORDER — TRAZODONE HYDROCHLORIDE 100 MG/1
100 TABLET ORAL NIGHTLY
Qty: 90 TABLET | Refills: 1 | Status: SHIPPED | OUTPATIENT
Start: 2023-08-18

## 2023-08-18 RX ORDER — METHYLPREDNISOLONE 4 MG/1
TABLET ORAL
Qty: 1 KIT | Refills: 0 | Status: SHIPPED | OUTPATIENT
Start: 2023-08-18

## 2023-08-18 RX ORDER — ONDANSETRON 4 MG/1
4 TABLET, ORALLY DISINTEGRATING ORAL 3 TIMES DAILY PRN
Qty: 30 TABLET | Refills: 2 | Status: SHIPPED | OUTPATIENT
Start: 2023-08-18

## 2023-08-18 NOTE — PROGRESS NOTES
Meredith Arzate DO  Diplomate of the [employer] [address] Internal Medicine      Marilyn Dos Santos (: 1990) is a 33 y.o. female, here for evaluation of the following chief complaint(s): Insomnia and Diarrhea       ASSESSMENT/PLAN:  1. Nausea and vomiting, unspecified vomiting type  2. Diarrhea, unspecified type  3. Non-recurrent acute serous otitis media of right ear  4. Other fatigue  5. Psychophysiological insomnia  -     traZODone (DESYREL) 100 MG tablet; Take 1 tablet by mouth nightly, Disp-90 tablet, R-1Normal     -COVID/FLU NEG.  -Suspect viral illness, supportive care. --Instructed on how to take the steroids properly as well as potential side effects of the medication. Advised to let me know for any problems.  -Push fluids.  -The 'BRAT' diet is suggested, then progress to diet as tolerated as symptoms colette. Call if bloody stools, persistent diarrhea, vomiting, fever or abdominal pain.  -Will continue with trazodone as needed for insomnia, tolerating well without adverse effects. 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  -States that over the past couple of days she has developed nausea and vomiting with diarrhea. She is also developed right ear pain and pressure and fullness sensation. States that she also feels fatigued. No fevers. No shortness of breath or productive cough.  -Has chronic insomnia. Has done well with trazodone. No significant adverse effects from medication. ROS negative except as noted above today.     Social History     Tobacco Use    Smoking status: Former     Packs/day: 0.25     Years: 1.00     Pack years: 0.25     Types: Cigarettes    Smokeless tobacco: Never   Substance Use Topics    Alcohol use: Yes    Drug use: Not Currently     Types: Marijuana (Weed)     Vitals:    23 1445   BP: 130/70   Site: Left Upper Arm   Position: Sitting   Pulse: 76   Resp: 16   SpO2: 97%

## 2023-09-22 ENCOUNTER — TELEPHONE (OUTPATIENT)
Dept: INTERNAL MEDICINE CLINIC | Facility: CLINIC | Age: 33
End: 2023-09-22

## 2023-10-17 ENCOUNTER — OFFICE VISIT (OUTPATIENT)
Dept: INTERNAL MEDICINE CLINIC | Facility: CLINIC | Age: 33
End: 2023-10-17
Payer: COMMERCIAL

## 2023-10-17 VITALS
BODY MASS INDEX: 30.16 KG/M2 | DIASTOLIC BLOOD PRESSURE: 74 MMHG | HEART RATE: 97 BPM | RESPIRATION RATE: 16 BRPM | HEIGHT: 65 IN | OXYGEN SATURATION: 98 % | TEMPERATURE: 97 F | SYSTOLIC BLOOD PRESSURE: 118 MMHG | WEIGHT: 181 LBS

## 2023-10-17 DIAGNOSIS — R52 BODY ACHES: ICD-10-CM

## 2023-10-17 DIAGNOSIS — R11.2 NAUSEA AND VOMITING, UNSPECIFIED VOMITING TYPE: Primary | ICD-10-CM

## 2023-10-17 DIAGNOSIS — R05.1 ACUTE COUGH: ICD-10-CM

## 2023-10-17 LAB
EXP DATE SOLUTION: NORMAL
EXP DATE SWAB: NORMAL
EXPIRATION DATE: NORMAL
INFLUENZA A ANTIGEN, POC: NEGATIVE
INFLUENZA B ANTIGEN, POC: NEGATIVE
LOT NUMBER POC: NORMAL
LOT NUMBER SOLUTION: NORMAL
LOT NUMBER SWAB: NORMAL
SARS-COV-2 RNA, POC: NEGATIVE
VALID INTERNAL CONTROL, POC: YES

## 2023-10-17 PROCEDURE — 99214 OFFICE O/P EST MOD 30 MIN: CPT | Performed by: NURSE PRACTITIONER

## 2023-10-17 PROCEDURE — 87804 INFLUENZA ASSAY W/OPTIC: CPT | Performed by: NURSE PRACTITIONER

## 2023-10-17 PROCEDURE — 87635 SARS-COV-2 COVID-19 AMP PRB: CPT | Performed by: NURSE PRACTITIONER

## 2023-10-17 RX ORDER — ONDANSETRON 4 MG/1
4 TABLET, ORALLY DISINTEGRATING ORAL 3 TIMES DAILY PRN
Qty: 21 TABLET | Refills: 0 | Status: SHIPPED | OUTPATIENT
Start: 2023-10-17

## 2023-10-17 ASSESSMENT — ENCOUNTER SYMPTOMS
SORE THROAT: 1
VOMITING: 1
NAUSEA: 1
COUGH: 1
ABDOMINAL PAIN: 1

## 2023-10-17 NOTE — PROGRESS NOTES
Abbe Jones (:  1990) is a 29 y.o. female,Established patient, here for evaluation of the following chief complaint(s):  Cough, Generalized Body Aches, and Nausea & Vomiting         ASSESSMENT/PLAN:  1. Nausea and vomiting, unspecified vomiting type  2. Acute cough  -     AMB POC COVID-19 COV  -     AMB POC RAPID INFLUENZA TEST  3. Body aches  -     AMB POC COVID-19 COV  -     AMB POC RAPID INFLUENZA TEST      No follow-ups on file. Nausea and vomiting x 24 hours  Negative covid and flu, has had multiple work exposures  Discussed symptomatic treatment  Zofran for nausea, tylenol for fever or body aches  Discussed her recent N/V episodes and recurrent today, with marijuana use, may be a factor in her nausea/vomiting  F/u if worsening or no improvement      Subjective   SUBJECTIVE/OBJECTIVE:  Patient is here for URI. She felt bad yesterday morning. She went back to sleep and then woke up at 2pm vomiting. She took some old zofran that helped with nausea but she still doesn't feel well. She has sore throat, body aches, and nausea. She is taking ibuprofen    Generalized Body Aches  This is a new problem. The current episode started yesterday. Associated symptoms include abdominal pain, congestion, coughing, fatigue, nausea, a sore throat and vomiting. Associated symptoms comments: Diarrhea  . Nausea & Vomiting  Associated symptoms include abdominal pain, congestion, coughing, fatigue, nausea, a sore throat and vomiting. Review of Systems   Constitutional:  Positive for fatigue. HENT:  Positive for congestion and sore throat. Respiratory:  Positive for cough. Gastrointestinal:  Positive for abdominal pain, nausea and vomiting. Objective   Physical Exam  Vitals reviewed. Constitutional:       Appearance: Normal appearance. She is obese. She is not ill-appearing. HENT:      Head: Normocephalic.       Right Ear: External ear normal.      Left Ear: External ear normal.   Eyes:

## 2024-01-10 ENCOUNTER — OFFICE VISIT (OUTPATIENT)
Dept: INTERNAL MEDICINE CLINIC | Facility: CLINIC | Age: 34
End: 2024-01-10
Payer: COMMERCIAL

## 2024-01-10 VITALS
HEART RATE: 86 BPM | OXYGEN SATURATION: 98 % | HEIGHT: 65 IN | SYSTOLIC BLOOD PRESSURE: 130 MMHG | RESPIRATION RATE: 16 BRPM | TEMPERATURE: 97.9 F | WEIGHT: 186 LBS | DIASTOLIC BLOOD PRESSURE: 70 MMHG | BODY MASS INDEX: 30.99 KG/M2

## 2024-01-10 DIAGNOSIS — J02.9 PHARYNGITIS, UNSPECIFIED ETIOLOGY: Primary | ICD-10-CM

## 2024-01-10 DIAGNOSIS — J01.00 ACUTE NON-RECURRENT MAXILLARY SINUSITIS: ICD-10-CM

## 2024-01-10 LAB
EXP DATE SOLUTION: NORMAL
EXP DATE SWAB: NORMAL
EXPIRATION DATE: NORMAL
GROUP A STREP ANTIGEN, POC: NEGATIVE
LOT NUMBER POC: NORMAL
LOT NUMBER SOLUTION: NORMAL
LOT NUMBER SWAB: NORMAL
SARS-COV-2 RNA, POC: NEGATIVE
VALID INTERNAL CONTROL, POC: YES

## 2024-01-10 PROCEDURE — 99213 OFFICE O/P EST LOW 20 MIN: CPT | Performed by: FAMILY MEDICINE

## 2024-01-10 PROCEDURE — 87880 STREP A ASSAY W/OPTIC: CPT | Performed by: FAMILY MEDICINE

## 2024-01-10 PROCEDURE — 87635 SARS-COV-2 COVID-19 AMP PRB: CPT | Performed by: FAMILY MEDICINE

## 2024-01-10 RX ORDER — CEFDINIR 300 MG/1
300 CAPSULE ORAL 2 TIMES DAILY
Qty: 14 CAPSULE | Refills: 0 | Status: SHIPPED | OUTPATIENT
Start: 2024-01-10 | End: 2024-01-17

## 2024-01-10 ASSESSMENT — PATIENT HEALTH QUESTIONNAIRE - PHQ9
SUM OF ALL RESPONSES TO PHQ QUESTIONS 1-9: 0
SUM OF ALL RESPONSES TO PHQ9 QUESTIONS 1 & 2: 0
1. LITTLE INTEREST OR PLEASURE IN DOING THINGS: 0
SUM OF ALL RESPONSES TO PHQ QUESTIONS 1-9: 0
2. FEELING DOWN, DEPRESSED OR HOPELESS: 0
SUM OF ALL RESPONSES TO PHQ QUESTIONS 1-9: 0
SUM OF ALL RESPONSES TO PHQ QUESTIONS 1-9: 0

## 2024-01-10 NOTE — PROGRESS NOTES
Farhan Quijano DO  Diplomate of the American Board of Family Medicine  [facility] [facility]      Amber Sanchez (: 1990) is a 33 y.o. female, here for evaluation of the following chief complaint(s):  Sore Throat and Sinusitis       ASSESSMENT/PLAN:  1. Pharyngitis, unspecified etiology  -     cefdinir (OMNICEF) 300 MG capsule; Take 1 capsule by mouth 2 times daily for 7 days, Disp-14 capsule, R-0Normal  2. Acute non-recurrent maxillary sinusitis  -     cefdinir (OMNICEF) 300 MG capsule; Take 1 capsule by mouth 2 times daily for 7 days, Disp-14 capsule, R-0Normal     -Instructed on the proper use of antibiotics.  Also stressed the importance of taking the full course to help prevent the risk of resistance.  Advised taking pro-biotics (yogurt, align or equivalent) while on antibiotics to reduce the risk of C. Diff infection.  Continue with inhalers.  Symptomatic therapy suggested: gargle salt water for sore throat, use nasal saline mist at bedside for congestion.  Apply facial warm packs for sinus pain.  May use acetaminophen, ibuprofen, antihistamine of choice. Increase fluids and rest.          SUBJECTIVE/OBJECTIVE:  HPI:  Has been having increasing sore throat over the past 3 to 5 days.  She is also started to develop increasing nasal congestion as well.  She does have asthma and has been using her inhalers but reports some mild shortness of breath also.  No fevers.  No mucopurulent productive sputum.  She does work in healthcare.  Tolerating p.o. well.  ROS negative except as noted above today.    Social History     Tobacco Use    Smoking status: Former     Current packs/day: 0.25     Average packs/day: 0.3 packs/day for 1 year (0.3 ttl pk-yrs)     Types: Cigarettes    Smokeless tobacco: Never   Substance Use Topics    Alcohol use: Yes    Drug use: Not Currently     Types: Marijuana (Weed)     Vitals:    01/10/24 0917   BP: 130/70   Site: Left Upper Arm   Position: Sitting   Pulse: 86

## 2024-01-15 ENCOUNTER — OFFICE VISIT (OUTPATIENT)
Dept: INTERNAL MEDICINE CLINIC | Facility: CLINIC | Age: 34
End: 2024-01-15
Payer: COMMERCIAL

## 2024-01-15 VITALS
HEIGHT: 65 IN | WEIGHT: 184 LBS | DIASTOLIC BLOOD PRESSURE: 78 MMHG | OXYGEN SATURATION: 98 % | BODY MASS INDEX: 30.66 KG/M2 | HEART RATE: 99 BPM | SYSTOLIC BLOOD PRESSURE: 140 MMHG | RESPIRATION RATE: 16 BRPM

## 2024-01-15 DIAGNOSIS — R06.02 SOB (SHORTNESS OF BREATH): Primary | ICD-10-CM

## 2024-01-15 DIAGNOSIS — R09.02 HYPOXIA: ICD-10-CM

## 2024-01-15 DIAGNOSIS — J45.51 SEVERE PERSISTENT ASTHMA WITH EXACERBATION: ICD-10-CM

## 2024-01-15 PROCEDURE — 99214 OFFICE O/P EST MOD 30 MIN: CPT | Performed by: FAMILY MEDICINE

## 2024-01-15 PROCEDURE — 96372 THER/PROPH/DIAG INJ SC/IM: CPT | Performed by: FAMILY MEDICINE

## 2024-01-15 RX ORDER — TESTOSTERONE CYPIONATE 200 MG/ML
4 INJECTION INTRAMUSCULAR ONCE
Status: COMPLETED | OUTPATIENT
Start: 2024-01-15 | End: 2024-01-15

## 2024-01-15 RX ORDER — PREDNISONE 20 MG/1
TABLET ORAL
Qty: 21 TABLET | Refills: 0 | Status: SHIPPED | OUTPATIENT
Start: 2024-01-15

## 2024-01-15 RX ADMIN — TESTOSTERONE CYPIONATE 4 MG: 200 INJECTION INTRAMUSCULAR at 16:12

## 2024-01-15 NOTE — PROGRESS NOTES
Farhan Quijano, DO  Diplomate of the American Board of Family Medicine  [facility]      Amber Sanchez (: 1990) is a 33 y.o. female, here for evaluation of the following chief complaint(s):  Shortness of Breath       ASSESSMENT/PLAN:  1. SOB (shortness of breath)  -     CT CHEST W CONTRAST; Future  -     predniSONE (DELTASONE) 20 MG tablet; 60mg daily x 3 days, then 40mg x 3 days, then 20mg x 3 days, then 10mg x 3 days, then D/C., Disp-21 tablet, R-0Normal  2. Hypoxia  -     CT CHEST W CONTRAST; Future  -     predniSONE (DELTASONE) 20 MG tablet; 60mg daily x 3 days, then 40mg x 3 days, then 20mg x 3 days, then 10mg x 3 days, then D/C., Disp-21 tablet, R-0Normal  3. Severe persistent asthma with exacerbation  -     CT CHEST W CONTRAST; Future  -     [facility]  -     predniSONE (DELTASONE) 20 MG tablet; 60mg daily x 3 days, then 40mg x 3 days, then 20mg x 3 days, then 10mg x 3 days, then D/C., Disp-21 tablet, R-0Normal     Will go ahead and give dexamethasone here in the office today.  Patient has received injectable steroids before and done fine.  Place her on prednisone taper.  Will go ahead and do CT pulmonary embolism protocol.  Patient states that she does not tolerate contrast fine without problems.  Continue with Advair as well as albuterol.  Will go ahead and refer to pulmonology.  To ER for worsening or no improvement        SUBJECTIVE/OBJECTIVE:  HPI:  Patient with history of asthma.  She is currently on Advair as well as albuterol.  We did start her on antibiotics last week for bronchitis.  At that time she was doing okay with albuterol.  She states since then she is to continue to progressively have more shortness of breath with wheezing and dyspnea.  Yesterday she did check her saturation after walking and it dropped down to 87%.  Today sats are remaining in the 90s.  She is using her Advair as directed.  Using albuterol every 2-4 hours.

## 2024-01-19 DIAGNOSIS — R09.02 HYPOXIA: ICD-10-CM

## 2024-01-19 DIAGNOSIS — R06.02 SOB (SHORTNESS OF BREATH): Primary | ICD-10-CM

## 2024-03-18 DIAGNOSIS — F51.04 PSYCHOPHYSIOLOGICAL INSOMNIA: ICD-10-CM

## 2024-03-18 RX ORDER — TRAZODONE HYDROCHLORIDE 100 MG/1
100 TABLET ORAL NIGHTLY
Qty: 90 TABLET | Refills: 1 | Status: CANCELLED | OUTPATIENT
Start: 2024-03-18

## 2024-03-19 ENCOUNTER — OFFICE VISIT (OUTPATIENT)
Dept: INTERNAL MEDICINE CLINIC | Facility: CLINIC | Age: 34
End: 2024-03-19
Payer: COMMERCIAL

## 2024-03-19 VITALS
WEIGHT: 187 LBS | SYSTOLIC BLOOD PRESSURE: 112 MMHG | HEIGHT: 65 IN | DIASTOLIC BLOOD PRESSURE: 82 MMHG | TEMPERATURE: 98.9 F | BODY MASS INDEX: 31.16 KG/M2 | OXYGEN SATURATION: 98 % | HEART RATE: 90 BPM

## 2024-03-19 DIAGNOSIS — R07.89 CHEST TIGHTNESS: ICD-10-CM

## 2024-03-19 DIAGNOSIS — F51.04 PSYCHOPHYSIOLOGICAL INSOMNIA: ICD-10-CM

## 2024-03-19 DIAGNOSIS — R06.2 WHEEZING: ICD-10-CM

## 2024-03-19 DIAGNOSIS — J30.89 ENVIRONMENTAL AND SEASONAL ALLERGIES: Primary | ICD-10-CM

## 2024-03-19 PROCEDURE — 99214 OFFICE O/P EST MOD 30 MIN: CPT | Performed by: NURSE PRACTITIONER

## 2024-03-19 RX ORDER — PREDNISONE 10 MG/1
TABLET ORAL
Qty: 1 EACH | Refills: 0 | Status: SHIPPED | OUTPATIENT
Start: 2024-03-19

## 2024-03-19 RX ORDER — TRAZODONE HYDROCHLORIDE 100 MG/1
100 TABLET ORAL NIGHTLY
Qty: 90 TABLET | Refills: 1 | Status: SHIPPED | OUTPATIENT
Start: 2024-03-19

## 2024-03-19 RX ORDER — ALBUTEROL SULFATE 90 UG/1
2 AEROSOL, METERED RESPIRATORY (INHALATION) 4 TIMES DAILY PRN
Qty: 18 G | Refills: 5 | Status: SHIPPED | OUTPATIENT
Start: 2024-03-19

## 2024-03-19 ASSESSMENT — ENCOUNTER SYMPTOMS
CHEST TIGHTNESS: 1
WHEEZING: 1
SORE THROAT: 0
COUGH: 1
RHINORRHEA: 0
DIFFICULTY BREATHING: 1
SHORTNESS OF BREATH: 1

## 2024-03-19 NOTE — PROGRESS NOTES
Amber Sanchez (:  1990) is a 33 y.o. female,Established patient, here for evaluation of the following chief complaint(s):  Asthma (Flare up /Coughing started Monday, she is winded and wheezing and not feeling well, she states she does have an appointment with Pulmonary doctor tomorrow )         ASSESSMENT/PLAN:  1. Environmental and seasonal allergies  2. Psychophysiological insomnia  -     traZODone (DESYREL) 100 MG tablet; Take 1 tablet by mouth nightly, Disp-90 tablet, R-1Normal  3. Chest tightness  4. Wheezing      No follow-ups on file.       Sensation of wheezing and tightness x 2 days  Lungs sound clear on exam  Keep pulmonary appt tomorrow  Has had relief with steroids previously, will send in steroid pack  Discussed med risks/side effects  Use albuterol for sensation of wheezing and tightness  Advise to avoid all smoking (marijuana and tobacco)     Subjective   SUBJECTIVE/OBJECTIVE:  Patient is here for ewelina exacerbation. She woke up at 3am with coughing fit and had to use inhaler. That happened multiple times  and Monday. She feels very tight this AM was wheezing and coughing. She is taking her allergy meds but doesn't feel any relief. She also feels burning pressure in her nose and cheeks.     Asthma  She complains of chest tightness, cough, difficulty breathing, shortness of breath and wheezing. Episode onset: 2 days. The cough is non-productive. Pertinent negatives include no ear congestion, ear pain, fever, myalgias, nasal congestion, PND, rhinorrhea or sore throat. Her past medical history is significant for asthma.       Review of Systems   Constitutional:  Negative for fever.   HENT:  Negative for ear pain, rhinorrhea and sore throat.    Respiratory:  Positive for cough, shortness of breath and wheezing.    Cardiovascular:  Negative for PND.   Musculoskeletal:  Negative for myalgias.          Objective   Physical Exam  Vitals reviewed.   Constitutional:       Appearance:

## 2024-03-20 ENCOUNTER — OFFICE VISIT (OUTPATIENT)
Dept: PULMONOLOGY | Age: 34
End: 2024-03-20
Payer: COMMERCIAL

## 2024-03-20 ENCOUNTER — HOSPITAL ENCOUNTER (OUTPATIENT)
Dept: GENERAL RADIOLOGY | Age: 34
Discharge: HOME | End: 2024-03-23
Payer: COMMERCIAL

## 2024-03-20 VITALS
WEIGHT: 187 LBS | HEIGHT: 64 IN | DIASTOLIC BLOOD PRESSURE: 82 MMHG | BODY MASS INDEX: 31.92 KG/M2 | HEART RATE: 123 BPM | OXYGEN SATURATION: 98 % | RESPIRATION RATE: 17 BRPM | SYSTOLIC BLOOD PRESSURE: 136 MMHG

## 2024-03-20 DIAGNOSIS — J30.89 ENVIRONMENTAL AND SEASONAL ALLERGIES: ICD-10-CM

## 2024-03-20 DIAGNOSIS — J45.50 SEVERE PERSISTENT ASTHMA, UNSPECIFIED WHETHER COMPLICATED: ICD-10-CM

## 2024-03-20 DIAGNOSIS — J45.50 SEVERE PERSISTENT ASTHMA, UNSPECIFIED WHETHER COMPLICATED: Primary | ICD-10-CM

## 2024-03-20 LAB
EXPIRATORY TIME: NORMAL
FEF 25-75% %PRED-PRE: NORMAL
FEF 25-75% PRED: NORMAL
FEF 25-75-PRE: NORMAL
FEV1 %PRED-PRE: 90 %
FEV1 PRED: NORMAL
FEV1/FVC %PRED-PRE: NORMAL
FEV1/FVC PRED: NORMAL
FEV1/FVC: 86 %
FEV1: 2.83 L
FVC %PRED-PRE: 88 %
FVC PRED: NORMAL
FVC: 3.3 L
PEF %PRED-PRE: NORMAL
PEF PRED: NORMAL
PEF-PRE: NORMAL

## 2024-03-20 PROCEDURE — 71046 X-RAY EXAM CHEST 2 VIEWS: CPT

## 2024-03-20 PROCEDURE — 99204 OFFICE O/P NEW MOD 45 MIN: CPT | Performed by: NURSE PRACTITIONER

## 2024-03-20 RX ORDER — CETIRIZINE HYDROCHLORIDE 5 MG/1
5 TABLET ORAL DAILY
COMMUNITY

## 2024-03-20 RX ORDER — MONTELUKAST SODIUM 10 MG/1
10 TABLET ORAL NIGHTLY
Qty: 30 TABLET | Refills: 3 | Status: SHIPPED | OUTPATIENT
Start: 2024-03-20

## 2024-03-20 RX ORDER — DIPHENHYDRAMINE HCL 25 MG
25 TABLET ORAL EVERY 6 HOURS PRN
COMMUNITY

## 2024-03-20 RX ORDER — FLUTICASONE PROPIONATE AND SALMETEROL 500; 50 UG/1; UG/1
1 POWDER RESPIRATORY (INHALATION) EVERY 12 HOURS
Qty: 1 EACH | Refills: 11 | Status: SHIPPED | OUTPATIENT
Start: 2024-03-20

## 2024-03-20 ASSESSMENT — PULMONARY FUNCTION TESTS
FVC: 3.30
FEV1: 2.83
FVC_PERCENT_PREDICTED_PRE: 88
FEV1_PERCENT_PREDICTED_PRE: 90
FEV1/FVC: 86

## 2024-03-21 NOTE — RESULT ENCOUNTER NOTE
Hey Amber, Your chest xray looks good.  No concerns on it.  Lets stick with our plan for now and if anything changes, let me know.

## 2024-03-25 ENCOUNTER — TELEPHONE (OUTPATIENT)
Dept: INTERNAL MEDICINE CLINIC | Facility: CLINIC | Age: 34
End: 2024-03-25

## 2024-03-25 NOTE — TELEPHONE ENCOUNTER
Attempted to contact Sarah, went to . Notified Sarah via  that the only appointment available with Dr. Quijano is the one that she scheduled. Notified that she is more than welcome to see ML tomorrow if needed.

## 2024-03-25 NOTE — TELEPHONE ENCOUNTER
Told Sarah that she is not on patients LOBO    States patient went to ER and they sent her home she had a Seizure mom states all she is doing is sleeping and crying    Wanted to see Dr. Quijano on Tuesday told her he was not in the office on Tuesdays and Thursdays offered MLV but mom states she does not want to see her.    Mom ask that you call her back I made appointment with Dr. Quijano on Wednesday at 2:00pm

## 2024-03-27 ENCOUNTER — OFFICE VISIT (OUTPATIENT)
Dept: INTERNAL MEDICINE CLINIC | Facility: CLINIC | Age: 34
End: 2024-03-27
Payer: COMMERCIAL

## 2024-03-27 VITALS
HEART RATE: 90 BPM | HEIGHT: 64 IN | WEIGHT: 188 LBS | SYSTOLIC BLOOD PRESSURE: 150 MMHG | DIASTOLIC BLOOD PRESSURE: 80 MMHG | OXYGEN SATURATION: 98 % | BODY MASS INDEX: 32.1 KG/M2

## 2024-03-27 DIAGNOSIS — R53.1 RIGHT SIDED WEAKNESS: ICD-10-CM

## 2024-03-27 DIAGNOSIS — E80.21 ACUTE INTERMITTENT PORPHYRIA (HCC): ICD-10-CM

## 2024-03-27 DIAGNOSIS — G40.909 SEIZURE DISORDER (HCC): Primary | ICD-10-CM

## 2024-03-27 PROCEDURE — 99214 OFFICE O/P EST MOD 30 MIN: CPT | Performed by: FAMILY MEDICINE

## 2024-03-27 RX ORDER — LEVETIRACETAM 500 MG/1
500 TABLET ORAL 2 TIMES DAILY
Qty: 60 TABLET | Refills: 3 | Status: SHIPPED | OUTPATIENT
Start: 2024-03-27

## 2024-03-27 NOTE — PROGRESS NOTES
Farhan Quijano DO  Diplomate of the American Board of Family Medicine  [facility]      Amber Sanchez (: 1990) is a 33 y.o. female, here for evaluation of the following chief complaint(s):  Seizures       ASSESSMENT/PLAN:  1. Seizure disorder (HCC)  -     levETIRAcetam (KEPPRA) 500 MG tablet; Take 1 tablet by mouth 2 times daily, Disp-60 tablet, R-3Normal  -     MRI BRAIN WO CONTRAST; Future  -     [facility]  2. Acute intermittent porphyria (HCC)  3. Right sided weakness     Will go ahead and restart Keppra as ordered.  Stat MRI brain.  Referral to neurology.  Advised to go back to ER for any recurrent or worsening seizure activity.        SUBJECTIVE/OBJECTIVE:  HPI: Patient comes in with her mother today.  She had an acute seizure this past Monday.  She does have a history of previous seizure disorder in the past.  She was diagnosed with acute intermittent porphyria and had seizures associated with her menstrual cycles.  She has previously been on Keppra.  This resolved once she had a hysterectomy performed.  During this last seizure on Monday, she was loaded with p.o. as well as IV Keppra.  However they did not give her prescription for oral therapy on discharge.  She had 1 more seizure earlier today as well.  This was witnessed by family.  She is also been having some right-sided weakness since the onset of the seizures.  No other focal neurologic deficits.  ROS negative except as noted above today.    Social History     Tobacco Use    Smoking status: Former     Current packs/day: 0.25     Average packs/day: 0.3 packs/day for 1 year (0.3 ttl pk-yrs)     Types: Cigarettes    Smokeless tobacco: Never   Substance Use Topics    Alcohol use: Yes    Drug use: Not Currently     Types: Marijuana (Weed)     Vitals:    24 1401   BP: (!) 150/80   Site: Left Upper Arm   Position: Sitting   Pulse: 90   SpO2: 98%   Weight: 85.3 kg (188 lb)   Height:

## 2024-03-28 ENCOUNTER — HOSPITAL ENCOUNTER (OUTPATIENT)
Age: 34
Discharge: HOME | End: 2024-03-30
Payer: COMMERCIAL

## 2024-03-28 ENCOUNTER — TELEPHONE (OUTPATIENT)
Dept: INTERNAL MEDICINE CLINIC | Facility: CLINIC | Age: 34
End: 2024-03-28

## 2024-03-28 ENCOUNTER — OFFICE VISIT (OUTPATIENT)
Dept: NEUROLOGY | Age: 34
End: 2024-03-28
Payer: COMMERCIAL

## 2024-03-28 VITALS
HEART RATE: 82 BPM | SYSTOLIC BLOOD PRESSURE: 121 MMHG | WEIGHT: 189 LBS | HEIGHT: 64 IN | BODY MASS INDEX: 32.27 KG/M2 | OXYGEN SATURATION: 96 % | DIASTOLIC BLOOD PRESSURE: 87 MMHG

## 2024-03-28 DIAGNOSIS — G40.909 SEIZURE DISORDER (HCC): ICD-10-CM

## 2024-03-28 DIAGNOSIS — R29.898 WEAKNESS OF RIGHT LOWER EXTREMITY: ICD-10-CM

## 2024-03-28 DIAGNOSIS — M79.10 MUSCLE PAIN: ICD-10-CM

## 2024-03-28 DIAGNOSIS — R56.9 SEIZURE (HCC): ICD-10-CM

## 2024-03-28 DIAGNOSIS — R29.898 WEAKNESS OF RIGHT UPPER EXTREMITY: ICD-10-CM

## 2024-03-28 DIAGNOSIS — R20.2 PARESTHESIA OF SKIN: ICD-10-CM

## 2024-03-28 DIAGNOSIS — R56.9 SEIZURE (HCC): Primary | ICD-10-CM

## 2024-03-28 DIAGNOSIS — E80.21 ACUTE INTERMITTENT PORPHYRIA (HCC): ICD-10-CM

## 2024-03-28 LAB
CK SERPL-CCNC: 33 U/L (ref 21–215)
CRP SERPL-MCNC: 2.5 MG/DL (ref 0–0.9)
ERYTHROCYTE [SEDIMENTATION RATE] IN BLOOD: 18 MM/HR (ref 0–20)

## 2024-03-28 PROCEDURE — 70553 MRI BRAIN STEM W/O & W/DYE: CPT

## 2024-03-28 PROCEDURE — 72156 MRI NECK SPINE W/O & W/DYE: CPT

## 2024-03-28 PROCEDURE — 6360000004 HC RX CONTRAST MEDICATION: Performed by: PHYSICAL THERAPIST

## 2024-03-28 PROCEDURE — 99205 OFFICE O/P NEW HI 60 MIN: CPT | Performed by: PHYSICAL THERAPIST

## 2024-03-28 PROCEDURE — A9579 GAD-BASE MR CONTRAST NOS,1ML: HCPCS | Performed by: PHYSICAL THERAPIST

## 2024-03-28 RX ORDER — LEVETIRACETAM 250 MG/1
250 TABLET ORAL 2 TIMES DAILY
Qty: 58 TABLET | Refills: 0 | Status: SHIPPED | OUTPATIENT
Start: 2024-03-28

## 2024-03-28 RX ORDER — LEVETIRACETAM 750 MG/1
750 TABLET ORAL 2 TIMES DAILY
Qty: 120 TABLET | Refills: 5 | Status: SHIPPED | OUTPATIENT
Start: 2024-04-23

## 2024-03-28 RX ADMIN — GADOTERIDOL 17 ML: 279.3 INJECTION, SOLUTION INTRAVENOUS at 15:19

## 2024-03-28 ASSESSMENT — PATIENT HEALTH QUESTIONNAIRE - PHQ9
SUM OF ALL RESPONSES TO PHQ QUESTIONS 1-9: 0
1. LITTLE INTEREST OR PLEASURE IN DOING THINGS: NOT AT ALL
SUM OF ALL RESPONSES TO PHQ QUESTIONS 1-9: 0
SUM OF ALL RESPONSES TO PHQ QUESTIONS 1-9: 0
2. FEELING DOWN, DEPRESSED OR HOPELESS: NOT AT ALL
SUM OF ALL RESPONSES TO PHQ9 QUESTIONS 1 & 2: 0
SUM OF ALL RESPONSES TO PHQ QUESTIONS 1-9: 0

## 2024-03-28 NOTE — PROGRESS NOTES
[facility] [address], [address]  [place] [address]  [phone]      Chief Complaint   Patient presents with    New Patient       HPI  Amber Sanchez is a 33 y.o. female with significant past medical history of acute intermittent porphyria who presents on referral from her PCP Dr. Farhan Quijano for seizure-like activity.  5 AM this past Monday she reports feeling \"weird\" with lightheadedness and general feelings of discomfort.  There is no recollection of anything that happened after this point till she arrived to the hospital.  Mother states that patient called her and when she walked in the room the patient was very stiff, foaming at the mouth, and not responding.  There were no convulsive jerks at this time.  No bowel/bladder incontinence.  No tongue biting.  The stiffness lasted for roughly 5 minutes before patient was able to relax.  At this time the mother called EMS and the patient was very confused.  She was seen in the ED and loaded with Keppra for presumed seizure.  She has had a past history of seizure, last one was 7 years ago.  This was associated to her acute intermittent porphyria.  Following her hysterectomy she did not have any seizure recurrence and has not been on medication since 2017.  On discharge she was told to take Keppra 500 mg twice daily.      Interval History  Since event on Monday she has had increased pain, weakness, and paresthesias in right upper and lower extremity.  She is unable to walk secondary to these symptoms.  She is also stating that at night her sister tells her she is making strange noises and has right upper extremity rhythmical jerking/twitching.  She states she is not following with hematology as her prior hematologist told her since he did not endorse her hysterectomy he would not continue to follow her.  PCP is ordered MRI which is scheduled to be done today.     Past Medical History:   Diagnosis Date    Abdominal pain

## 2024-03-28 NOTE — TELEPHONE ENCOUNTER
Will give work note through next week. Patient notified that Neurology can write extended work excuse once imaging results are back.

## 2024-03-28 NOTE — TELEPHONE ENCOUNTER
----- Message from Magalie Brito sent at 3/28/2024  1:13 PM EDT -----  Subject: Message to Provider    QUESTIONS  Information for Provider? Patient needs a doctors note for her work from   3/18/2023 to unknown date at this time.   ---------------------------------------------------------------------------  --------------  CALL BACK INFO  [phone]; OK to leave message on voicemail  ---------------------------------------------------------------------------  --------------  SCRIPT ANSWERS  Relationship to Patient? Self

## 2024-03-29 ENCOUNTER — TELEPHONE (OUTPATIENT)
Dept: INTERNAL MEDICINE CLINIC | Facility: CLINIC | Age: 34
End: 2024-03-29

## 2024-03-29 DIAGNOSIS — E80.20 PORPHYRIA, UNSPECIFIED PORPHYRIA TYPE (HCC): Primary | ICD-10-CM

## 2024-03-29 NOTE — TELEPHONE ENCOUNTER
Neurology doctor called and recommends after reviewing patient's imaging that she would benefit from seeing Hematology/onc again as she hasn't followed up with them since approx 2021.     Please advise if this is okay. If so, dx?

## 2024-04-05 ENCOUNTER — TELEPHONE (OUTPATIENT)
Dept: NEUROLOGY | Age: 34
End: 2024-04-05

## 2024-04-05 NOTE — TELEPHONE ENCOUNTER
Patient's mother called to request work note for her daughter because she is unable to return at this time. She states that Dolly is still not able to walk without assistance and is still having severe headaches. Her right arm and leg are still very weak.    Patient starts PT on Tuesday and is hoping this will help.

## 2024-04-09 ENCOUNTER — TELEPHONE (OUTPATIENT)
Dept: NEUROLOGY | Age: 34
End: 2024-04-09

## 2024-04-09 NOTE — TELEPHONE ENCOUNTER
Patients mother called stating patient was having new symptoms of shaking, difficulty getting out of bed. Advised to take patient to ER.

## 2024-04-22 RX ORDER — LEVETIRACETAM 250 MG/1
250 TABLET ORAL 2 TIMES DAILY
Qty: 58 TABLET | Refills: 0 | OUTPATIENT
Start: 2024-04-22

## 2024-04-24 ENCOUNTER — OFFICE VISIT (OUTPATIENT)
Dept: INTERNAL MEDICINE CLINIC | Facility: CLINIC | Age: 34
End: 2024-04-24
Payer: COMMERCIAL

## 2024-04-24 VITALS
DIASTOLIC BLOOD PRESSURE: 78 MMHG | WEIGHT: 187 LBS | SYSTOLIC BLOOD PRESSURE: 130 MMHG | OXYGEN SATURATION: 98 % | BODY MASS INDEX: 31.92 KG/M2 | HEART RATE: 96 BPM | HEIGHT: 64 IN

## 2024-04-24 DIAGNOSIS — R00.0 TACHYCARDIA: ICD-10-CM

## 2024-04-24 DIAGNOSIS — E80.21 ACUTE INTERMITTENT PORPHYRIA (HCC): ICD-10-CM

## 2024-04-24 DIAGNOSIS — R56.9 SEIZURE-LIKE ACTIVITY (HCC): Primary | ICD-10-CM

## 2024-04-24 LAB — TSH W FREE THYROID IF ABNORMAL: 2.5 UIU/ML (ref 0.27–4.2)

## 2024-04-24 PROCEDURE — 93000 ELECTROCARDIOGRAM COMPLETE: CPT | Performed by: FAMILY MEDICINE

## 2024-04-24 PROCEDURE — 99214 OFFICE O/P EST MOD 30 MIN: CPT | Performed by: FAMILY MEDICINE

## 2024-04-24 SDOH — ECONOMIC STABILITY: HOUSING INSECURITY
IN THE LAST 12 MONTHS, WAS THERE A TIME WHEN YOU DID NOT HAVE A STEADY PLACE TO SLEEP OR SLEPT IN A SHELTER (INCLUDING NOW)?: NO

## 2024-04-24 SDOH — ECONOMIC STABILITY: FOOD INSECURITY: WITHIN THE PAST 12 MONTHS, YOU WORRIED THAT YOUR FOOD WOULD RUN OUT BEFORE YOU GOT MONEY TO BUY MORE.: NEVER TRUE

## 2024-04-24 SDOH — ECONOMIC STABILITY: FOOD INSECURITY: WITHIN THE PAST 12 MONTHS, THE FOOD YOU BOUGHT JUST DIDN'T LAST AND YOU DIDN'T HAVE MONEY TO GET MORE.: NEVER TRUE

## 2024-04-24 SDOH — ECONOMIC STABILITY: INCOME INSECURITY: HOW HARD IS IT FOR YOU TO PAY FOR THE VERY BASICS LIKE FOOD, HOUSING, MEDICAL CARE, AND HEATING?: NOT HARD AT ALL

## 2024-04-24 NOTE — PROGRESS NOTES
Farhan Quijano DO  Diplomate of the American Board of Family Medicine  [facility]      Amber Sanchez (: 1990) is a 33 y.o. female, here for evaluation of the following chief complaint(s):  Seizures       ASSESSMENT/PLAN:  1. Seizure-like activity (HCC)  2. Acute intermittent porphyria (HCC)  3. Tachycardia     Advised her to go ahead and contact neurology regarding the Keppra.  Advised that she restart this given her history of seizures.  She will contact neurology.  MRI looks okay.  Has follow-up scheduled with hematology for acute intermittent porphyria.  EKG done today.  Will go ahead and check TSH as well as set her up for Holter monitor.        SUBJECTIVE/OBJECTIVE:  HPI:  Patient comes in today with her friend.  She had a recent seizure at activity event.  Was at the hospital.  We did serum placed on Keppra.  Neurology followed her up and increase her Keppra to 750.  She states that she could not take it so she subsequently stopped.  States she did not like the way it made her feel.  She denies any new seizure activity or neurologic symptoms.  She does state that she has noticed through her Apple Watch that at times at night her heart rate will get as high as 150.  Sometimes is in the 120s as well.  She is asymptomatic as far as is concerned.  She does note that her mother has a history of tachyarrhythmia as well.  ROS negative except as noted above today.    Social History     Tobacco Use    Smoking status: Former     Current packs/day: 0.00     Average packs/day: 0.3 packs/day for 2.3 years (0.6 ttl pk-yrs)     Types: Cigarettes     Start date: 2012     Quit date: 2013     Years since quitting: 10.6    Smokeless tobacco: Never   Substance Use Topics    Alcohol use: Yes    Drug use: Yes     Frequency: 7.0 times per week     Types: Marijuana (Weed)     Vitals:    24 1414   BP: 130/78   Site: Left Upper Arm   Position: Sitting   Pulse: 96   SpO2: 98%   Weight:

## 2024-04-29 DIAGNOSIS — Z87.898 HISTORY OF SEIZURES: Primary | ICD-10-CM

## 2024-04-29 RX ORDER — LACOSAMIDE 100 MG/1
100 TABLET ORAL 2 TIMES DAILY
Qty: 120 TABLET | Refills: 3 | Status: SHIPPED | OUTPATIENT
Start: 2024-04-29 | End: 2024-12-25

## 2024-05-17 NOTE — PROGRESS NOTES
NEW PATIENT INTAKE    Referral Diagnosis: Porphyria, unspecified porphyria type    Referring Provider:  Farhan Quijano DO    Primary Care Provider: Farhan Quijano DO    Family History of Cancer/ Hematology Disorders: Paternal half-sister with ovarian cancer in her 20's; Paternal half-sister with ovarian cancer in her 30's; mother with cervical cancer at age 32 and kidney cancer at age 49; maternal great uncle with melanoma; maternal great aunt with colon cancer; maternal great aunt with pancreatic cancer; and paternal uncle with lung cancer     Presenting Symptoms: Seizure    Chronological History of Pertinent Events: Ms. Sanchez is a 33-year-old white female referred for porphyria. She was reportedly diagnosed with porphyria in 2006 at age 15 by Dr. Sawyer's with reported labs: ALA dehydratase enzyme levels low at 3.7; porphobilinogen deaminase level at 4.6.  She reported experiencing acute attacks appearing to occur around the time of her menstrual cycle, consisting of progressive symptoms starting with severe abdominal pain and nausea which eventually become seizures. She was treated with Panhematin and D10 for each of these attacks. She presented to Dr. Angeles for new patient consult on 2/18/16 looking for a hematologist to provide long term care/management and advice regarding a hysterectomy to prevent menstruation. Dr. Angeles noted, “She reports these attacks appear to occur around the time of her menstrual cycle, and despite being on continuous low dose OCPs she has attacks every 3-4 months; only thing that helps is 5-HTC which she smokes 10-12 joints/day. She has never had repeat diagnostic tests; a history of psychiatric diagnosis/malingering is referred to in her old records; she denies other symptoms including rash, jaundice, dark urine, bleeding or blood clots; no blood related issues; no genetic tests performed; negative family history but no family genetic testing done.”    Genetic testing

## 2024-06-10 DIAGNOSIS — Z78.0 MENOPAUSE: ICD-10-CM

## 2024-06-10 RX ORDER — ESTRADIOL 1 MG/1
1 TABLET ORAL DAILY
Qty: 90 TABLET | Refills: 0 | Status: SHIPPED | OUTPATIENT
Start: 2024-06-10

## 2024-07-04 DIAGNOSIS — Z78.0 MENOPAUSE: ICD-10-CM

## 2024-07-05 RX ORDER — ESTRADIOL 1 MG/1
1 TABLET ORAL DAILY
Qty: 90 TABLET | Refills: 0 | OUTPATIENT
Start: 2024-07-05

## 2024-09-08 DIAGNOSIS — F51.04 PSYCHOPHYSIOLOGICAL INSOMNIA: ICD-10-CM

## 2024-09-08 DIAGNOSIS — Z78.0 MENOPAUSE: ICD-10-CM

## 2024-09-09 RX ORDER — ESTRADIOL 1 MG/1
1 TABLET ORAL DAILY
Qty: 90 TABLET | Refills: 0 | OUTPATIENT
Start: 2024-09-09

## 2024-09-09 RX ORDER — TRAZODONE HYDROCHLORIDE 100 MG/1
100 TABLET ORAL NIGHTLY
Qty: 90 TABLET | Refills: 0 | Status: SHIPPED | OUTPATIENT
Start: 2024-09-09

## 2024-10-14 NOTE — PROGRESS NOTES
HPI  Amber Sanchez is a 34 y.o. female seen for follow up menopause.  The Estrace 1 mg is working well for her.     Past Medical History, Past Surgical History, Family history, Social History, and Medications were all reviewed with the patient today and updated as necessary.     Current Outpatient Medications   Medication Sig    estradiol (ESTRACE) 1 MG tablet Take 1 tablet by mouth daily    traZODone (DESYREL) 100 MG tablet Take 1 tablet by mouth nightly    cetirizine (ZYRTEC) 5 MG tablet Take 1 tablet by mouth daily    diphenhydrAMINE (BENADRYL) 25 MG tablet Take 1 tablet by mouth at bedtime    albuterol sulfate HFA (VENTOLIN HFA) 108 (90 Base) MCG/ACT inhaler Inhale 2 puffs into the lungs 4 times daily as needed for Wheezing    ondansetron (ZOFRAN-ODT) 4 MG disintegrating tablet Take 1 tablet by mouth 3 times daily as needed for Nausea or Vomiting    fluticasone (FLONASE) 50 MCG/ACT nasal spray 2 sprays by Nasal route daily    SUMAtriptan (IMITREX) 100 MG tablet 1 at onset of migraine, repeat in 2 hours if needed Indications: a migraine headache    lacosamide (VIMPAT) 100 MG TABS tablet Take 1 tablet by mouth 2 times daily for 240 days. Max Daily Amount: 200 mg (Patient not taking: Reported on 10/15/2024)    fluticasone-salmeterol (ADVAIR DISKUS) 500-50 MCG/ACT AEPB diskus inhaler Inhale 1 puff into the lungs in the morning and 1 puff in the evening. (Patient not taking: Reported on 10/15/2024)     No current facility-administered medications for this visit.     Allergies   Allergen Reactions    Amoxicillin Hives    Hydrocodone-Acetaminophen Myalgia    Meperidine      Other reaction(s): Abdominal pain-Intolerance  seizures    Iodine Rash    Adhesive Tape      Other reaction(s): Contact Dermatitis  Dermabond    Montelukast Swelling    Ciprofloxacin Rash     Arm, fingers, stomach    Nickel Rash    Sulfa Antibiotics Rash     fever     Past Medical History:   Diagnosis Date    Abdominal pain     AIP (acute

## 2024-10-15 ENCOUNTER — OFFICE VISIT (OUTPATIENT)
Dept: OBGYN CLINIC | Age: 34
End: 2024-10-15

## 2024-10-15 VITALS
BODY MASS INDEX: 33.97 KG/M2 | HEIGHT: 64 IN | DIASTOLIC BLOOD PRESSURE: 78 MMHG | WEIGHT: 199 LBS | SYSTOLIC BLOOD PRESSURE: 118 MMHG

## 2024-10-15 DIAGNOSIS — Z78.0 MENOPAUSE: ICD-10-CM

## 2024-10-15 PROCEDURE — 99214 OFFICE O/P EST MOD 30 MIN: CPT | Performed by: OBSTETRICS & GYNECOLOGY

## 2024-10-15 RX ORDER — ESTRADIOL 1 MG/1
1 TABLET ORAL DAILY
Qty: 90 TABLET | Refills: 4 | Status: SHIPPED | OUTPATIENT
Start: 2024-10-15

## 2024-11-25 ENCOUNTER — OFFICE VISIT (OUTPATIENT)
Dept: INTERNAL MEDICINE CLINIC | Facility: CLINIC | Age: 34
End: 2024-11-25

## 2024-11-25 VITALS
HEIGHT: 64 IN | DIASTOLIC BLOOD PRESSURE: 80 MMHG | BODY MASS INDEX: 34.15 KG/M2 | SYSTOLIC BLOOD PRESSURE: 110 MMHG | OXYGEN SATURATION: 97 % | WEIGHT: 200 LBS | TEMPERATURE: 98.2 F | HEART RATE: 78 BPM

## 2024-11-25 DIAGNOSIS — J02.9 SORE THROAT: ICD-10-CM

## 2024-11-25 DIAGNOSIS — R05.1 ACUTE COUGH: ICD-10-CM

## 2024-11-25 DIAGNOSIS — H65.03 NON-RECURRENT ACUTE SEROUS OTITIS MEDIA OF BOTH EARS: ICD-10-CM

## 2024-11-25 DIAGNOSIS — J40 BRONCHITIS: Primary | ICD-10-CM

## 2024-11-25 DIAGNOSIS — J45.41 MODERATE PERSISTENT ASTHMA WITH EXACERBATION: ICD-10-CM

## 2024-11-25 PROCEDURE — 99213 OFFICE O/P EST LOW 20 MIN: CPT | Performed by: FAMILY MEDICINE

## 2024-11-25 PROCEDURE — 87635 SARS-COV-2 COVID-19 AMP PRB: CPT | Performed by: FAMILY MEDICINE

## 2024-11-25 PROCEDURE — 87880 STREP A ASSAY W/OPTIC: CPT | Performed by: FAMILY MEDICINE

## 2024-11-25 RX ORDER — CODEINE PHOSPHATE AND GUAIFENESIN 10; 100 MG/5ML; MG/5ML
5 SOLUTION ORAL 3 TIMES DAILY PRN
Qty: 180 ML | Refills: 0 | Status: SHIPPED | OUTPATIENT
Start: 2024-11-25 | End: 2024-12-09

## 2024-11-25 RX ORDER — PREDNISONE 20 MG/1
TABLET ORAL
Qty: 21 TABLET | Refills: 0 | Status: SHIPPED | OUTPATIENT
Start: 2024-11-25

## 2024-11-25 RX ORDER — DOXYCYCLINE HYCLATE 100 MG
100 TABLET ORAL 2 TIMES DAILY
Qty: 14 TABLET | Refills: 0 | Status: SHIPPED | OUTPATIENT
Start: 2024-11-25 | End: 2024-12-02

## 2024-11-25 NOTE — PROGRESS NOTES
Farhan Quijano,   Diplomate of the American Board of Family Medicine  [facility]    Amber Sanchez (: 1990) is a 34 y.o. female, here for evaluation of the following chief complaint(s):  Cough (Productive x 1 week ), Ear Pain (Right sided), and Laryngitis     Assessment & Plan  1. Bronchitis  Guafenisin (Mucinex) to thin secretions  Acetaminophen (Tylenol) for fever.    Antibiotics to completion with good fluid intake.  Cough syrup as ordered. Advised her how to take medication properly.  Discussed the potential side effects--including addiction--and benefits of the medication.  She is to call with any problems or concerns.  I have reviewed the patient’s controlled substance prescription history, as maintained in the [place] prescription monitoring program, so that the prescription(s) for a controlled substance can be given.      2. Asthma Exacerbation.  -Instructed on how to take the steroids properly as well as potential side effects of the medication.  Advised to let me know for any problems.  -Continue Wixela/albuterol    3. Serous otitis media  Steroids as ordered.      1. Bronchitis  -     guaiFENesin-codeine (GUAIFENESIN AC) 100-10 MG/5ML liquid; Take 5 mLs by mouth 3 times daily as needed for Cough for up to 14 days. Max Daily Amount: 15 mLs, Disp-180 mL, R-0Normal  2. Acute cough  -     AMB POC COVID-19 COV  3. Sore throat  -     AMB POC RAPID STREP A  4. Moderate persistent asthma with exacerbation  5. Non-recurrent acute serous otitis media of both ears    History of Present Illness  The patient presents for evaluation of cough and sore throat.    She sought emergency care on Saturday due to acute onset of cough and sore throat that began on Saturday. Despite  testing, no specific cause was identified. Her symptoms have worsened. She also reports severe ear pain that started last night and has persisted into today. She has a history of asthma and uses Wixela

## 2024-12-02 ENCOUNTER — OFFICE VISIT (OUTPATIENT)
Dept: INTERNAL MEDICINE CLINIC | Facility: CLINIC | Age: 34
End: 2024-12-02

## 2024-12-02 ENCOUNTER — HOSPITAL ENCOUNTER (OUTPATIENT)
Dept: NUCLEAR MEDICINE | Age: 34
Discharge: HOME | End: 2024-12-05
Attending: FAMILY MEDICINE

## 2024-12-02 ENCOUNTER — HOSPITAL ENCOUNTER (OUTPATIENT)
Dept: GENERAL RADIOLOGY | Age: 34
Discharge: HOME | End: 2024-12-05
Attending: FAMILY MEDICINE

## 2024-12-02 ENCOUNTER — TELEPHONE (OUTPATIENT)
Dept: INTERNAL MEDICINE CLINIC | Facility: CLINIC | Age: 34
End: 2024-12-02

## 2024-12-02 VITALS
DIASTOLIC BLOOD PRESSURE: 80 MMHG | SYSTOLIC BLOOD PRESSURE: 136 MMHG | BODY MASS INDEX: 33.97 KG/M2 | OXYGEN SATURATION: 96 % | HEART RATE: 90 BPM | WEIGHT: 199 LBS | HEIGHT: 64 IN | TEMPERATURE: 98.3 F

## 2024-12-02 DIAGNOSIS — R06.02 SOB (SHORTNESS OF BREATH): ICD-10-CM

## 2024-12-02 DIAGNOSIS — J45.50 SEVERE PERSISTENT ASTHMA, UNSPECIFIED WHETHER COMPLICATED: ICD-10-CM

## 2024-12-02 DIAGNOSIS — R07.89 OTHER CHEST PAIN: ICD-10-CM

## 2024-12-02 DIAGNOSIS — J45.41 MODERATE PERSISTENT ASTHMA WITH EXACERBATION: ICD-10-CM

## 2024-12-02 DIAGNOSIS — R06.02 SOB (SHORTNESS OF BREATH): Primary | ICD-10-CM

## 2024-12-02 PROCEDURE — 71046 X-RAY EXAM CHEST 2 VIEWS: CPT

## 2024-12-02 PROCEDURE — A9540 TC99M MAA: HCPCS | Performed by: FAMILY MEDICINE

## 2024-12-02 PROCEDURE — 99214 OFFICE O/P EST MOD 30 MIN: CPT | Performed by: FAMILY MEDICINE

## 2024-12-02 PROCEDURE — A9539 TC99M PENTETATE: HCPCS | Performed by: FAMILY MEDICINE

## 2024-12-02 PROCEDURE — 3430000000 HC RX DIAGNOSTIC RADIOPHARMACEUTICAL: Performed by: FAMILY MEDICINE

## 2024-12-02 PROCEDURE — 78582 LUNG VENTILAT&PERFUS IMAGING: CPT

## 2024-12-02 RX ORDER — PREDNISONE 20 MG/1
TABLET ORAL
Qty: 21 TABLET | Refills: 0 | Status: SHIPPED | OUTPATIENT
Start: 2024-12-02

## 2024-12-02 RX ORDER — FLUTICASONE PROPIONATE AND SALMETEROL 500; 50 UG/1; UG/1
1 POWDER RESPIRATORY (INHALATION) EVERY 12 HOURS
Qty: 1 EACH | Refills: 11
Start: 2024-12-02

## 2024-12-02 RX ORDER — KIT FOR THE PREPARATION OF TECHNETIUM TC 99M PENTETATE 20 MG/1
39.8 INJECTION, POWDER, LYOPHILIZED, FOR SOLUTION INTRAVENOUS; RESPIRATORY (INHALATION)
Status: COMPLETED | OUTPATIENT
Start: 2024-12-02 | End: 2024-12-02

## 2024-12-02 RX ADMIN — KIT FOR THE PREPARATION OF TECHNETIUM TC 99M PENTETATE 39.8 MILLICURIE: 20 INJECTION, POWDER, LYOPHILIZED, FOR SOLUTION INTRAVENOUS; RESPIRATORY (INHALATION) at 13:30

## 2024-12-02 RX ADMIN — KIT FOR THE PREPARATION OF TECHNETIUM TC 99M ALBUMIN AGGREGATED 6.2 MILLICURIE: 2.5 INJECTION, POWDER, FOR SOLUTION INTRAVENOUS at 13:41

## 2024-12-02 NOTE — PROGRESS NOTES
Farhan Quijano DO  Diplomate of the American Board of Family Medicine  [facility]      Amber Sanchez (: 1990) is a 34 y.o. female, here for evaluation of the following chief complaint(s):  Cold Symptoms       ASSESSMENT/PLAN:  1. SOB (shortness of breath)  -     NM LUNG VENT/PERFUSION (VQ); Future  2. Other chest pain  -     NM LUNG VENT/PERFUSION (VQ); Future  3. Moderate persistent asthma with exacerbation  -     predniSONE (DELTASONE) 20 MG tablet; 60mg daily x 3 days, then 40mg x 3 days, then 20mg x 3 days, then 10mg x 3 days, then D/C., Disp-21 tablet, R-0Normal     Check V/Q scan to rule out PE--pt allergic to contrast.  Repeat steroids as providing good benefit.  Continue with Advair/albuterol on prn basis.  Further recommendations pending clinical course and workup.          SUBJECTIVE/OBJECTIVE:  HPI:  Patient was recently seen for an asthma exacerbation with pleuritic chest pain.  We did place her on antibiotics as well as prednisone.  She states that she is doing better, but continues to have persistent shortness of breath with sharp chest pain with deep breathing and cough, especially on the left side.  She gets short of breath with exertion.  She has been using her inhalers as directed.  Still with some wheezing at times.  Significantly decreased mucopurulent productive sputum.  She is on her last dose of antibiotics today.  ROS negative except as noted above today.    Social History     Tobacco Use    Smoking status: Former     Current packs/day: 0.00     Average packs/day: 0.3 packs/day for 2.3 years (0.6 ttl pk-yrs)     Types: Cigarettes     Start date: 2012     Quit date: 2013     Years since quittin.2    Smokeless tobacco: Never   Vaping Use    Vaping status: Never Used   Substance Use Topics    Alcohol use: Yes    Drug use: Yes     Frequency: 7.0 times per week     Types: Marijuana (Weed)     Vitals:    24 1213   BP: 136/80   Site: Left Upper

## 2024-12-09 DIAGNOSIS — F51.04 PSYCHOPHYSIOLOGICAL INSOMNIA: ICD-10-CM

## 2024-12-10 DIAGNOSIS — F51.04 PSYCHOPHYSIOLOGICAL INSOMNIA: ICD-10-CM

## 2024-12-11 RX ORDER — TRAZODONE HYDROCHLORIDE 100 MG/1
100 TABLET ORAL NIGHTLY
Qty: 90 TABLET | Refills: 0 | OUTPATIENT
Start: 2024-12-11

## 2024-12-11 RX ORDER — TRAZODONE HYDROCHLORIDE 100 MG/1
100 TABLET ORAL NIGHTLY
Qty: 90 TABLET | Refills: 0 | Status: SHIPPED | OUTPATIENT
Start: 2024-12-11

## 2025-01-03 ENCOUNTER — OFFICE VISIT (OUTPATIENT)
Dept: INTERNAL MEDICINE CLINIC | Facility: CLINIC | Age: 35
End: 2025-01-03

## 2025-01-03 VITALS
SYSTOLIC BLOOD PRESSURE: 120 MMHG | OXYGEN SATURATION: 98 % | HEIGHT: 64 IN | WEIGHT: 196 LBS | BODY MASS INDEX: 33.46 KG/M2 | HEART RATE: 88 BPM | DIASTOLIC BLOOD PRESSURE: 72 MMHG

## 2025-01-03 DIAGNOSIS — J40 BRONCHITIS: ICD-10-CM

## 2025-01-03 DIAGNOSIS — R06.02 SOB (SHORTNESS OF BREATH): ICD-10-CM

## 2025-01-03 DIAGNOSIS — J45.41 MODERATE PERSISTENT ASTHMA WITH EXACERBATION: ICD-10-CM

## 2025-01-03 DIAGNOSIS — U07.1 COVID-19: Primary | ICD-10-CM

## 2025-01-03 PROCEDURE — 99214 OFFICE O/P EST MOD 30 MIN: CPT | Performed by: FAMILY MEDICINE

## 2025-01-03 RX ORDER — DOXYCYCLINE HYCLATE 100 MG
100 TABLET ORAL 2 TIMES DAILY
Qty: 14 TABLET | Refills: 0 | Status: SHIPPED | OUTPATIENT
Start: 2025-01-03 | End: 2025-01-10

## 2025-01-03 RX ORDER — PREDNISONE 20 MG/1
TABLET ORAL
Qty: 21 TABLET | Refills: 0 | Status: SHIPPED | OUTPATIENT
Start: 2025-01-03

## 2025-01-03 ASSESSMENT — PATIENT HEALTH QUESTIONNAIRE - PHQ9
SUM OF ALL RESPONSES TO PHQ QUESTIONS 1-9: 0
1. LITTLE INTEREST OR PLEASURE IN DOING THINGS: NOT AT ALL
SUM OF ALL RESPONSES TO PHQ QUESTIONS 1-9: 0
2. FEELING DOWN, DEPRESSED OR HOPELESS: NOT AT ALL
SUM OF ALL RESPONSES TO PHQ9 QUESTIONS 1 & 2: 0
SUM OF ALL RESPONSES TO PHQ QUESTIONS 1-9: 0
SUM OF ALL RESPONSES TO PHQ QUESTIONS 1-9: 0

## 2025-01-03 NOTE — PROGRESS NOTES
Farhan Quijano DO  Diplomate of the American Board of Family Medicine  [facility]    Amber Sanchez (: 1990) is a 34 y.o. female, here for evaluation of the following chief complaint(s):  Shortness of Breath     Assessment & Plan  Post-COVID-19 shortness of breath  - Persistent shortness of breath and chest pain despite lower dose prednisone  - Likely secondary infection due to asthma history and recent COVID-19  - Initiate high-dose prednisone taper  - Prescribe doxycycline for pneumonia  -Instructed on how to take the steroids properly as well as potential side effects of the medication.  Advised to let me know for any problems.  - Advise follow-up with pulmonologist  - Provide work excuse note  - Seek immediate medical attention if condition worsens---to ER    Asthma  - Continues nocturnal symptoms requiring frequent albuterol use  - Continue with Advair and albuterol on prn basis    1. COVID-19  2. SOB (shortness of breath)  3. Moderate persistent asthma with exacerbation  -     predniSONE (DELTASONE) 20 MG tablet; 60mg daily x 3 days, then 40mg x 3 days, then 20mg x 3 days, then 10mg x 3 days, then D/C., Disp-21 tablet, R-0Normal  -     doxycycline hyclate (VIBRA-TABS) 100 MG tablet; Take 1 tablet by mouth 2 times daily for 7 days, Disp-14 tablet, R-0Normal  4. Bronchitis  -     doxycycline hyclate (VIBRA-TABS) 100 MG tablet; Take 1 tablet by mouth 2 times daily for 7 days, Disp-14 tablet, R-0Normal    History of Present Illness  The patient presents for evaluation of shortness of breath.    Diagnosed with COVID-19 on  with respiratory symptoms, fever, and body aches. Currently reports persistent shortness of breath and chest discomfort. On a steroid regimen since Tuesday, but symptoms persist. No antibiotics prescribed.   History of asthma, under care of pulmonologist and treatment includes Advair and albuterol as needed. Reports nocturnal awakenings due to

## 2025-01-06 ENCOUNTER — TELEPHONE (OUTPATIENT)
Dept: PULMONOLOGY | Age: 35
End: 2025-01-06

## 2025-01-07 ENCOUNTER — TELEPHONE (OUTPATIENT)
Dept: PULMONOLOGY | Age: 35
End: 2025-01-07

## 2025-01-07 NOTE — TELEPHONE ENCOUNTER
TRIAGE CALL      Complaint: Coughing/Burning in chest  Cough: yes  Productive:  yes  Bloody Sputum:  no  Increased SOB/Wheezing:  yes  Duration: 2 months  Fever/Chills: no  OTC Meds tried: cough medicine  Has been on 2 different antibotics and 3 x steroids

## 2025-01-08 NOTE — TELEPHONE ENCOUNTER
Last seen: 3/20/24 new  Hx: asthma, environmental & seasonal allergies    Plan was return in July for methacholine & f/u appt; pt was no show.   Has seen PCP 1/3/25 for respiratory c/o.    Msg left from our office that routine f/u needed 1/6/25, pt returned call 1/7 w/ report of   Mychart msg w/ PCP indicates they plan to have f/u visit w/ ongoing symptoms.     Patient call reporting coughing & burning in chest for 2 months, recent 2 rounds of antibiotics & 3 of steroids.  [facility], ever since been dealing with burning in chest, asking for f/u appt in our office.   Worked in next week w/ NP.

## 2025-01-10 ENCOUNTER — TELEPHONE (OUTPATIENT)
Dept: INTERNAL MEDICINE CLINIC | Facility: CLINIC | Age: 35
End: 2025-01-10

## 2025-01-10 NOTE — TELEPHONE ENCOUNTER
Pt was offered appt but with inclement weather she was concerned with travel. Notified patient the only other option was for her to go to urgent care for further evaluation/treatment. Pt verbalized understanding

## 2025-03-10 DIAGNOSIS — F51.04 PSYCHOPHYSIOLOGICAL INSOMNIA: ICD-10-CM

## 2025-03-13 RX ORDER — TRAZODONE HYDROCHLORIDE 100 MG/1
100 TABLET ORAL NIGHTLY
Qty: 90 TABLET | Refills: 2 | Status: SHIPPED | OUTPATIENT
Start: 2025-03-13

## 2025-03-26 ENCOUNTER — TELEPHONE (OUTPATIENT)
Dept: INTERNAL MEDICINE CLINIC | Facility: CLINIC | Age: 35
End: 2025-03-26

## 2025-07-07 ENCOUNTER — OFFICE VISIT (OUTPATIENT)
Dept: INTERNAL MEDICINE CLINIC | Facility: CLINIC | Age: 35
End: 2025-07-07
Payer: COMMERCIAL

## 2025-07-07 VITALS
HEART RATE: 95 BPM | WEIGHT: 204 LBS | HEIGHT: 64 IN | TEMPERATURE: 98.5 F | SYSTOLIC BLOOD PRESSURE: 124 MMHG | BODY MASS INDEX: 34.83 KG/M2 | DIASTOLIC BLOOD PRESSURE: 80 MMHG | OXYGEN SATURATION: 99 %

## 2025-07-07 DIAGNOSIS — R19.7 NAUSEA VOMITING AND DIARRHEA: Primary | ICD-10-CM

## 2025-07-07 DIAGNOSIS — R19.7 DIARRHEA, UNSPECIFIED TYPE: ICD-10-CM

## 2025-07-07 DIAGNOSIS — R10.30 LOWER ABDOMINAL PAIN: ICD-10-CM

## 2025-07-07 DIAGNOSIS — R11.2 NAUSEA VOMITING AND DIARRHEA: Primary | ICD-10-CM

## 2025-07-07 LAB
ALBUMIN SERPL-MCNC: 3.4 G/DL (ref 3.5–5)
ALBUMIN/GLOB SERPL: 1 (ref 1–1.9)
ALP SERPL-CCNC: 97 U/L (ref 35–104)
ALT SERPL-CCNC: 33 U/L (ref 8–45)
ANION GAP SERPL CALC-SCNC: 14 MMOL/L (ref 7–16)
AST SERPL-CCNC: 23 U/L (ref 15–37)
BILIRUB SERPL-MCNC: 0.3 MG/DL (ref 0–1.2)
BILIRUBIN, URINE, POC: NEGATIVE
BLOOD URINE, POC: NEGATIVE
BUN SERPL-MCNC: 7 MG/DL (ref 6–23)
C. DIFFICILE TOXIN MOLECULAR: NEGATIVE
CALCIUM SERPL-MCNC: 9.6 MG/DL (ref 8.8–10.2)
CHLORIDE SERPL-SCNC: 101 MMOL/L (ref 98–107)
CO2 SERPL-SCNC: 22 MMOL/L (ref 20–29)
CREAT SERPL-MCNC: 0.86 MG/DL (ref 0.6–1.1)
ERYTHROCYTE [DISTWIDTH] IN BLOOD BY AUTOMATED COUNT: 13.1 % (ref 11.9–14.6)
GLOBULIN SER CALC-MCNC: 3.5 G/DL (ref 2.3–3.5)
GLUCOSE SERPL-MCNC: 110 MG/DL (ref 70–99)
GLUCOSE URINE, POC: NEGATIVE
HCT VFR BLD AUTO: 41.9 % (ref 35.8–46.3)
HGB BLD-MCNC: 13.5 G/DL (ref 11.7–15.4)
KETONES, URINE, POC: NEGATIVE
LEUKOCYTE ESTERASE, URINE, POC: NEGATIVE
LIPASE SERPL-CCNC: 15 U/L (ref 13–60)
MCH RBC QN AUTO: 27.8 PG (ref 26.1–32.9)
MCHC RBC AUTO-ENTMCNC: 32.2 G/DL (ref 31.4–35)
MCV RBC AUTO: 86.4 FL (ref 82–102)
NITRITE, URINE, POC: NEGATIVE
NRBC # BLD: 0 K/UL (ref 0–0.2)
PH, URINE, POC: 7 (ref 4.6–8)
PLATELET # BLD AUTO: 295 K/UL (ref 150–450)
PMV BLD AUTO: 11.6 FL (ref 9.4–12.3)
POTASSIUM SERPL-SCNC: 4.7 MMOL/L (ref 3.5–5.1)
PROT SERPL-MCNC: 6.9 G/DL (ref 6.3–8.2)
PROTEIN,URINE, POC: NEGATIVE
RBC # BLD AUTO: 4.85 M/UL (ref 4.05–5.2)
SODIUM SERPL-SCNC: 137 MMOL/L (ref 136–145)
SPECIFIC GRAVITY, URINE, POC: 1.01 (ref 1–1.03)
URINALYSIS CLARITY, POC: CLEAR
URINALYSIS COLOR, POC: YELLOW
UROBILINOGEN, POC: NORMAL
WBC # BLD AUTO: 10 K/UL (ref 4.3–11.1)

## 2025-07-07 PROCEDURE — 81003 URINALYSIS AUTO W/O SCOPE: CPT | Performed by: NURSE PRACTITIONER

## 2025-07-07 PROCEDURE — 99214 OFFICE O/P EST MOD 30 MIN: CPT | Performed by: NURSE PRACTITIONER

## 2025-07-07 RX ORDER — ONDANSETRON 4 MG/1
4 TABLET, ORALLY DISINTEGRATING ORAL 3 TIMES DAILY PRN
Qty: 21 TABLET | Refills: 0 | Status: SHIPPED | OUTPATIENT
Start: 2025-07-07

## 2025-07-07 SDOH — ECONOMIC STABILITY: FOOD INSECURITY: WITHIN THE PAST 12 MONTHS, THE FOOD YOU BOUGHT JUST DIDN'T LAST AND YOU DIDN'T HAVE MONEY TO GET MORE.: NEVER TRUE

## 2025-07-07 SDOH — ECONOMIC STABILITY: TRANSPORTATION INSECURITY
IN THE PAST 12 MONTHS, HAS LACK OF TRANSPORTATION KEPT YOU FROM MEETINGS, WORK, OR FROM GETTING THINGS NEEDED FOR DAILY LIVING?: NO

## 2025-07-07 SDOH — ECONOMIC STABILITY: TRANSPORTATION INSECURITY
IN THE PAST 12 MONTHS, HAS THE LACK OF TRANSPORTATION KEPT YOU FROM MEDICAL APPOINTMENTS OR FROM GETTING MEDICATIONS?: YES

## 2025-07-07 SDOH — ECONOMIC STABILITY: FOOD INSECURITY: WITHIN THE PAST 12 MONTHS, YOU WORRIED THAT YOUR FOOD WOULD RUN OUT BEFORE YOU GOT MONEY TO BUY MORE.: NEVER TRUE

## 2025-07-07 SDOH — ECONOMIC STABILITY: INCOME INSECURITY: IN THE LAST 12 MONTHS, WAS THERE A TIME WHEN YOU WERE NOT ABLE TO PAY THE MORTGAGE OR RENT ON TIME?: NO

## 2025-07-07 ASSESSMENT — ENCOUNTER SYMPTOMS
NAUSEA: 1
CHANGE IN BOWEL HABIT: 1
COUGH: 0
ABDOMINAL PAIN: 1
SWOLLEN GLANDS: 0
BLOATING: 1
DIARRHEA: 1
VOMITING: 1
SORE THROAT: 0

## 2025-07-07 ASSESSMENT — PATIENT HEALTH QUESTIONNAIRE - PHQ9
SUM OF ALL RESPONSES TO PHQ QUESTIONS 1-9: 2
2. FEELING DOWN, DEPRESSED OR HOPELESS: SEVERAL DAYS
SUM OF ALL RESPONSES TO PHQ QUESTIONS 1-9: 2
2. FEELING DOWN, DEPRESSED OR HOPELESS: SEVERAL DAYS
SUM OF ALL RESPONSES TO PHQ9 QUESTIONS 1 & 2: 2
1. LITTLE INTEREST OR PLEASURE IN DOING THINGS: SEVERAL DAYS
SUM OF ALL RESPONSES TO PHQ QUESTIONS 1-9: 2
1. LITTLE INTEREST OR PLEASURE IN DOING THINGS: SEVERAL DAYS
SUM OF ALL RESPONSES TO PHQ QUESTIONS 1-9: 2

## 2025-07-07 NOTE — PROGRESS NOTES
Abmer Sanchez (:  1990) is a 34 y.o. female,Established patient, here for evaluation of the following chief complaint(s):  Nausea & Vomiting and Diarrhea (Going on since last Wednesday- over the weekend felt like she was constipated was able to go to the bathroom but not much- yesterday started with stomach issues again with nausea and diarrhea- been to the bathroom 5 times already today)         Assessment & Plan  Nausea vomiting and diarrhea       Orders:    [facility], [place]    CBC; Future    Comprehensive Metabolic Panel; Future    Lipase; Future    AMB POC URINALYSIS DIP STICK AUTO W/O MICRO    Lower abdominal pain   Bilateral lower abdominal pain      Orders[facility] [place]    Diarrhea, unspecified type     Orders:    C. difficile toxin Molecular; Future    Culture, Stool; Future    Ova and Parasite Examination; Future    Fecal leukocytes; Future    POCT FECAL IMMUNOCHEMICAL TEST (FIT) ()      No follow-ups on file.     No acute abdominal pain on exam  N/V/D since last week  Had similar episode in April and went to ER  Had CT (no contrast - is allergic)  Check lab and stool studies  Give zofran for nausea  Refer to gastro    Subjective   Patient is here for diarrhea and vomiting since last Wednesday. She woke up vomiting. She did a telehealth visit and was told to stay home a few days for a virus. Thursday she had really bad diarrhea and pain. She took immodium and Saturday she felt constipated. She had a little hard BM on Saturday. Yesterday she felt constipated again. Today she had vomiting and diarrhea again.  She has had some acid reflux/burning this weekend (not normal for her).       Nausea & Vomiting  This is a new problem. The current episode started in the past 7 days. Associated symptoms include abdominal pain, anorexia, a change in bowel habit (diarrhea/constipation), nausea and

## 2025-07-08 ENCOUNTER — RESULTS FOLLOW-UP (OUTPATIENT)
Dept: INTERNAL MEDICINE CLINIC | Facility: CLINIC | Age: 35
End: 2025-07-08

## 2025-07-09 ENCOUNTER — OFFICE VISIT (OUTPATIENT)
Dept: GASTROENTEROLOGY | Age: 35
End: 2025-07-09
Payer: COMMERCIAL

## 2025-07-09 VITALS
WEIGHT: 202 LBS | BODY MASS INDEX: 35.79 KG/M2 | HEART RATE: 75 BPM | SYSTOLIC BLOOD PRESSURE: 113 MMHG | HEIGHT: 63 IN | DIASTOLIC BLOOD PRESSURE: 67 MMHG

## 2025-07-09 DIAGNOSIS — Z83.79 FAMILY HISTORY OF CROHN'S DISEASE: ICD-10-CM

## 2025-07-09 DIAGNOSIS — R19.7 DIARRHEA, UNSPECIFIED TYPE: ICD-10-CM

## 2025-07-09 DIAGNOSIS — R11.2 NAUSEA AND VOMITING, UNSPECIFIED VOMITING TYPE: ICD-10-CM

## 2025-07-09 DIAGNOSIS — R10.30 LOWER ABDOMINAL PAIN: ICD-10-CM

## 2025-07-09 DIAGNOSIS — R19.7 DIARRHEA, UNSPECIFIED TYPE: Primary | ICD-10-CM

## 2025-07-09 PROBLEM — R10.9 ABDOMINAL PAIN: Status: ACTIVE | Noted: 2025-07-09

## 2025-07-09 LAB
CEA SERPL-MCNC: 0.8 NG/ML (ref 0–3.8)
ERYTHROCYTE [SEDIMENTATION RATE] IN BLOOD: 12 MM/HR (ref 0–20)
O+P SPEC MICRO: NORMAL
O+P STL CONC: NORMAL
SPECIMEN SOURCE: NORMAL
SPECIMEN SOURCE: NORMAL
WHITE BLOOD CELLS (WBC), STOOL: NORMAL

## 2025-07-09 PROCEDURE — 99204 OFFICE O/P NEW MOD 45 MIN: CPT

## 2025-07-09 NOTE — PATIENT INSTRUCTIONS
Patient Education        Diarrhea: Care Instructions  Overview    Diarrhea is loose, watery stools (bowel movements). The exact cause is often hard to find. Sometimes diarrhea is your body's way of getting rid of what caused an upset stomach. Viruses, food poisoning, and many medicines can cause diarrhea. Some people get diarrhea in response to emotional stress, anxiety, or certain foods.  Almost everyone has diarrhea now and then. It usually isn't serious, and your stools will return to normal soon. The important thing to do is replace the fluids you have lost, so you can prevent dehydration.  The doctor has checked you carefully, but problems can develop later. If you notice any problems or new symptoms, get medical treatment right away .  Follow-up care is a key part of your treatment and safety. Be sure to make and go to all appointments, and call your doctor if you are having problems. It's also a good idea to know your test results and keep a list of the medicines you take.  How can you care for yourself at home?  Watch for signs of dehydration, which means your body has lost too much water. Dehydration is a serious condition and should be treated right away. Signs of dehydration are:  Increasing thirst and dry eyes and mouth.  Feeling faint or lightheaded.  A smaller amount of urine than normal.  To prevent dehydration, drink plenty of fluids. Choose water and other clear liquids until you feel better. If you have kidney, heart, or liver disease and have to limit fluids, talk with your doctor before you increase the amount of fluids you drink.  When you feel like eating, start with small amounts of food. You can try eating a snack or small meal every 3 to 4 hours.  Limit foods that contain sugar, lactose, fructose, high-fructose corn syrup, and sorbitol. Avoid spicy foods if they make your diarrhea worse. And avoid caffeine.  The doctor may recommend that you take over-the-counter medicine, such as loperamide

## 2025-07-09 NOTE — PROGRESS NOTES
Amber Sanchez (:  1990) is a 34 y.o. female new patient referred to our office for evaluation of the following chief complaint(s):  New Patient (N/V, mostly last week improved over weekend since has returned. Fluctuating between diarrhea and constipation over last week. fHx of diverticulitis )        Assessment & Plan   ASSESSMENT/PLAN:  Nausea and vomiting:  EGD with biopsies to rule out celiac and H. Pylori  Zofran 4mg PRN  Diarrhea:  Add on GI panel, calprotectin, fecal elastase, CRP, ESR stool studies  Abdominal pain:  EGD with biopsies  Stool stuides    Assessment & Plan  1. Nausea, vomiting, and diarrhea:  - Schedule upper endoscopy to rule out H. pylori infection or reflux disease.  - Conduct additional stool studies to check for inflammation indicative of Crohn's disease.  - Order two blood tests to assess for inflammation.  - Consume ginger ale or ginger tea for nausea relief.  - Maintain adequate hydration with Gatorade or IV fluids containing electrolytes.  - Continue using Zofran for nausea.  - Use Imodium for diarrhea if necessary.    Results  Labs   - Stool studies: Normal  Lab Results   Component Value Date    HGB 13.5 2025    WBC 10.0 2025     2025    MCV 86.4 2025    CREATININE 0.86 2025    ALT 33 2025    AST 23 2025       No follow-ups on file.         Subjective   SUBJECTIVE/OBJECTIVE  Amber Sanchez is a 34 y.o. year old female referred to our office for evaluation of nausea, vomiting and diarrhea. Referral note reviewed from 25.  Prior pertinent GI evaluation includes:    -CT ABDOMEN PELVIS WO CONTRAST 2025  Impression      1. No evidence of an acute abdominal or pelvic abnormality on noncontrast CT.  2. Diffuse hepatic steatosis.    History of Present Illness  The patient presents for evaluation of nausea, vomiting, and diarrhea.    She experienced nausea, vomiting, and diarrhea starting last Wednesday (2025)

## 2025-07-10 LAB
ADV 40+41 DNA STL QL NAA+NON-PROBE: NOT DETECTED
ASTRO TYP 1-8 RNA STL QL NAA+NON-PROBE: NOT DETECTED
C CAYETANENSIS DNA STL QL NAA+NON-PROBE: NOT DETECTED
C COLI+JEJ+UPSA DNA STL QL NAA+NON-PROBE: NOT DETECTED
C DIF TOX TCDA+TCDB STL QL NAA+NON-PROBE: NOT DETECTED
CALPROTECTIN STL-MCNT: 10 UG/G (ref 0–120)
CRYPTOSP DNA STL QL NAA+NON-PROBE: NOT DETECTED
E COLI O157 DNA STL QL NAA+NON-PROBE: ABNORMAL
E HISTOLYT DNA STL QL NAA+NON-PROBE: NOT DETECTED
EAEC PAA PLAS AGGR+AATA ST NAA+NON-PRB: NOT DETECTED
EC STX1+STX2 GENES STL QL NAA+NON-PROBE: NOT DETECTED
EPEC EAE GENE STL QL NAA+NON-PROBE: DETECTED
ETEC LTA+ST1A+ST1B TOX ST NAA+NON-PROBE: NOT DETECTED
G LAMBLIA DNA STL QL NAA+NON-PROBE: NOT DETECTED
NOROVIRUS GI+II RNA STL QL NAA+NON-PROBE: NOT DETECTED
P SHIGELLOIDES DNA STL QL NAA+NON-PROBE: NOT DETECTED
RVA RNA STL QL NAA+NON-PROBE: NOT DETECTED
S ENT+BONG DNA STL QL NAA+NON-PROBE: NOT DETECTED
SAPO I+II+IV+V RNA STL QL NAA+NON-PROBE: NOT DETECTED
SHIGELLA SP+EIEC IPAH ST NAA+NON-PROBE: NOT DETECTED
SPECIMEN SOURCE: ABNORMAL
V CHOL+PARA+VUL DNA STL QL NAA+NON-PROBE: NOT DETECTED
V CHOLERAE DNA STL QL NAA+NON-PROBE: NOT DETECTED
Y ENTEROCOL DNA STL QL NAA+NON-PROBE: NOT DETECTED

## 2025-07-10 NOTE — PERIOP NOTE
Patient verified name, , and procedure.    Type: 1a; abbreviated assessment per anesthesia guidelines  Labs per surgeon: None  Labs per anesthesia: None      Instructed pt that they will be notified by the Gi Lab for time of arrival. If any questions please call the GI lab at [phone].    Follow diet and prep instructions per office. Nothing to eat or drink after midnight.     Bath or shower the night before and the am of surgery with antibacterial soap. No lotions, oils, powders, cologne on skin. No make up, eye make up or jewelry. Wear loose fitting comfortable, clean clothing.     Must have adult present in building the entire time .     Medications for the day of procedure: Advair inhaler and bring Albuterol inhaler, Estradiol, Zofran (if needed)    Please hold all vitamins x 7 days prior to procedure and NSAIDS (Aspirin, Excedrin, Goody powders, Motrin, Ibuprofen, Advil, Aleve and Naproxen) x 5 days prior to procedure. Should you have a procedure date that does not allow for the amount of time instructed above, please stop taking vitamins, supplements, and NSAIDS IMMEDIATELY.       The following discharge instructions reviewed with patient: medication given during procedure may cause drowsiness for several hours, therefore, do not drive or operate machinery for remainder of the day, no alcohol on the day of your procedure, resume regular diet and activity unless otherwise directed, for mild sore throat you may use Cepacol throat lozenges or warm salt water gargles as needed, call your physician for any problems or questions. Patient verbalizes understanding.    [facility]

## 2025-07-11 ENCOUNTER — CLINICAL DOCUMENTATION (OUTPATIENT)
Dept: GASTROENTEROLOGY | Age: 35
End: 2025-07-11

## 2025-07-11 LAB — ELASTASE PANC STL-MCNT: 157 UG ELAST./G

## 2025-07-11 NOTE — PROGRESS NOTES
Spoke with patient this morning as she is on day 9 of feeling sick with ongoing nausea, vomiting and watery diarrhea. She stated she did vomit some blood this morning as well and her stool looks very abnormal but not bloody. She has vomitied 3x so far this morning as well. At this point recommended an ED visit as she is probably extremely dehydrated and concerned for UGIB since she did vomit some blood.     Tasha Jackson, APRN - CNP

## 2025-07-14 DIAGNOSIS — K86.89 PANCREATIC INSUFFICIENCY: Primary | ICD-10-CM

## 2025-07-14 LAB
BACTERIA ISLT: ABNORMAL
MICROORGANISM/AGENT SPEC: ABNORMAL
SPECIMEN SOURCE: ABNORMAL

## 2025-07-14 RX ORDER — AZITHROMYCIN 500 MG/1
500 TABLET, FILM COATED ORAL DAILY
Qty: 3 TABLET | Refills: 0 | Status: SHIPPED | OUTPATIENT
Start: 2025-07-14 | End: 2025-07-17

## 2025-07-14 NOTE — PROGRESS NOTES
Patient still with diarrhea along with nausea and vomiting. Organism ID came back positive for E. Coli as well. Will go ahead and treat with azithromycin 500mg daily for 3 days. Her pancreatic elastase also came back slightly low so will go ahead and start her on Creon 2 tablets with each meal and 1 tablet with snacks. Instructed her to give me a call or message me to let me know how she is doing in 3 days.     Tasha Jackson, APRN - CNP

## 2025-07-14 NOTE — PROGRESS NOTES
Dear Amber Sanchez,     Thank you for choosing [facility] for your upcoming endoscopic procedure. We appreciate the trust you have placed in us to provide your care.     Important Details Regarding Your Upcoming Procedure:     [address] [address] [place] [address]     Preparation: Refer to both provider and pre-assessment and prep instructions.     Arrival: Please arrive at the main entrance of the hospital, located past the statue of [place]. Once inside, proceed to the registration desk on the left in the main lobby.     Time of arrival: 1030 on Tuesday, 7/15/2025 NPO after 2400    Accompaniment: Please note that you will need a  who is 18 years old or greater to stay with you throughout the entire process, your  must not leave while you are in our care. This is a requirement for your safety and care.    Cancellation: If you are unable to keep this appointment, please contact the doctor's office associated with your procedure as soon as possible. We look forward to providing you with exceptional care and service. If you have any questions or concerns, please do not hesitate to reach out to us.     Sincerely, [facility] Endoscopy Team

## 2025-07-15 ENCOUNTER — ANESTHESIA EVENT (OUTPATIENT)
Dept: ENDOSCOPY | Age: 35
End: 2025-07-15
Payer: COMMERCIAL

## 2025-07-15 ENCOUNTER — HOSPITAL ENCOUNTER (OUTPATIENT)
Age: 35
Discharge: HOME | End: 2025-07-15
Attending: STUDENT IN AN ORGANIZED HEALTH CARE EDUCATION/TRAINING PROGRAM | Admitting: STUDENT IN AN ORGANIZED HEALTH CARE EDUCATION/TRAINING PROGRAM
Payer: COMMERCIAL

## 2025-07-15 ENCOUNTER — ANESTHESIA (OUTPATIENT)
Dept: ENDOSCOPY | Age: 35
End: 2025-07-15
Payer: COMMERCIAL

## 2025-07-15 VITALS
DIASTOLIC BLOOD PRESSURE: 56 MMHG | RESPIRATION RATE: 18 BRPM | TEMPERATURE: 97.7 F | SYSTOLIC BLOOD PRESSURE: 109 MMHG | WEIGHT: 200 LBS | HEIGHT: 63 IN | BODY MASS INDEX: 35.44 KG/M2 | HEART RATE: 59 BPM | OXYGEN SATURATION: 97 %

## 2025-07-15 DIAGNOSIS — K29.00 ACUTE GASTRITIS, PRESENCE OF BLEEDING UNSPECIFIED, UNSPECIFIED GASTRITIS TYPE: Primary | ICD-10-CM

## 2025-07-15 DIAGNOSIS — R10.9 ABDOMINAL PAIN: ICD-10-CM

## 2025-07-15 DIAGNOSIS — R11.2 NAUSEA AND VOMITING: ICD-10-CM

## 2025-07-15 LAB
BACTERIA SPEC CULT: ABNORMAL
SERVICE CMNT-IMP: ABNORMAL

## 2025-07-15 PROCEDURE — 7100000011 HC PHASE II RECOVERY - ADDTL 15 MIN: Performed by: STUDENT IN AN ORGANIZED HEALTH CARE EDUCATION/TRAINING PROGRAM

## 2025-07-15 PROCEDURE — 7100000010 HC PHASE II RECOVERY - FIRST 15 MIN: Performed by: STUDENT IN AN ORGANIZED HEALTH CARE EDUCATION/TRAINING PROGRAM

## 2025-07-15 PROCEDURE — 3609012400 HC EGD TRANSORAL BIOPSY SINGLE/MULTIPLE: Performed by: STUDENT IN AN ORGANIZED HEALTH CARE EDUCATION/TRAINING PROGRAM

## 2025-07-15 PROCEDURE — 2580000003 HC RX 258: Performed by: NURSE ANESTHETIST, CERTIFIED REGISTERED

## 2025-07-15 PROCEDURE — 88305 TISSUE EXAM BY PATHOLOGIST: CPT

## 2025-07-15 PROCEDURE — 2709999900 HC NON-CHARGEABLE SUPPLY: Performed by: STUDENT IN AN ORGANIZED HEALTH CARE EDUCATION/TRAINING PROGRAM

## 2025-07-15 PROCEDURE — 3700000001 HC ADD 15 MINUTES (ANESTHESIA): Performed by: STUDENT IN AN ORGANIZED HEALTH CARE EDUCATION/TRAINING PROGRAM

## 2025-07-15 PROCEDURE — 3700000000 HC ANESTHESIA ATTENDED CARE: Performed by: STUDENT IN AN ORGANIZED HEALTH CARE EDUCATION/TRAINING PROGRAM

## 2025-07-15 PROCEDURE — 6360000002 HC RX W HCPCS: Performed by: NURSE ANESTHETIST, CERTIFIED REGISTERED

## 2025-07-15 RX ORDER — PROPOFOL 10 MG/ML
INJECTION, EMULSION INTRAVENOUS
Status: DISCONTINUED | OUTPATIENT
Start: 2025-07-15 | End: 2025-07-15 | Stop reason: SDUPTHER

## 2025-07-15 RX ORDER — SODIUM CHLORIDE, SODIUM LACTATE, POTASSIUM CHLORIDE, CALCIUM CHLORIDE 600; 310; 30; 20 MG/100ML; MG/100ML; MG/100ML; MG/100ML
INJECTION, SOLUTION INTRAVENOUS
Status: DISCONTINUED | OUTPATIENT
Start: 2025-07-15 | End: 2025-07-15 | Stop reason: SDUPTHER

## 2025-07-15 RX ORDER — OMEPRAZOLE 40 MG/1
CAPSULE, DELAYED RELEASE ORAL
Qty: 180 CAPSULE | Refills: 0 | Status: SHIPPED | OUTPATIENT
Start: 2025-07-15 | End: 2025-11-12

## 2025-07-15 RX ORDER — LIDOCAINE HYDROCHLORIDE 20 MG/ML
INJECTION, SOLUTION EPIDURAL; INFILTRATION; INTRACAUDAL; PERINEURAL
Status: DISCONTINUED | OUTPATIENT
Start: 2025-07-15 | End: 2025-07-15 | Stop reason: SDUPTHER

## 2025-07-15 RX ORDER — SODIUM CHLORIDE 0.9 % (FLUSH) 0.9 %
5-40 SYRINGE (ML) INJECTION PRN
Status: DISCONTINUED | OUTPATIENT
Start: 2025-07-15 | End: 2025-07-15 | Stop reason: HOSPADM

## 2025-07-15 RX ORDER — ONDANSETRON 2 MG/ML
INJECTION INTRAMUSCULAR; INTRAVENOUS
Status: DISCONTINUED | OUTPATIENT
Start: 2025-07-15 | End: 2025-07-15 | Stop reason: SDUPTHER

## 2025-07-15 RX ORDER — SODIUM CHLORIDE 9 MG/ML
25 INJECTION, SOLUTION INTRAVENOUS PRN
Status: DISCONTINUED | OUTPATIENT
Start: 2025-07-15 | End: 2025-07-15 | Stop reason: HOSPADM

## 2025-07-15 RX ORDER — SODIUM CHLORIDE 0.9 % (FLUSH) 0.9 %
5-40 SYRINGE (ML) INJECTION EVERY 12 HOURS SCHEDULED
Status: DISCONTINUED | OUTPATIENT
Start: 2025-07-15 | End: 2025-07-15 | Stop reason: HOSPADM

## 2025-07-15 RX ADMIN — PROPOFOL 70 MG: 10 INJECTION, EMULSION INTRAVENOUS at 11:21

## 2025-07-15 RX ADMIN — SODIUM CHLORIDE, SODIUM LACTATE, POTASSIUM CHLORIDE, AND CALCIUM CHLORIDE: 600; 310; 30; 20 INJECTION, SOLUTION INTRAVENOUS at 11:18

## 2025-07-15 RX ADMIN — ONDANSETRON 4 MG: 2 INJECTION, SOLUTION INTRAMUSCULAR; INTRAVENOUS at 11:21

## 2025-07-15 RX ADMIN — LIDOCAINE HYDROCHLORIDE 40 MG: 20 INJECTION, SOLUTION EPIDURAL; INFILTRATION; INTRACAUDAL; PERINEURAL at 11:21

## 2025-07-15 RX ADMIN — PROPOFOL 60 MG: 10 INJECTION, EMULSION INTRAVENOUS at 11:29

## 2025-07-15 RX ADMIN — PROPOFOL 70 MG: 10 INJECTION, EMULSION INTRAVENOUS at 11:25

## 2025-07-15 ASSESSMENT — PAIN - FUNCTIONAL ASSESSMENT: PAIN_FUNCTIONAL_ASSESSMENT: 0-10

## 2025-07-15 ASSESSMENT — PAIN SCALES - GENERAL
PAINLEVEL_OUTOF10: 0

## 2025-07-15 NOTE — ANESTHESIA PRE PROCEDURE
Department of Anesthesiology  Preprocedure Note       Name:  Amber Sanchez   Age:  34 y.o.  :  1990                                          MRN:  [id number]         Date:  7/15/2025      Surgeon: Surgeon(s):  Greyson Tillman MD    Procedure: Procedure(s):  ESOPHAGOGASTRODUODENOSCOPY    Medications prior to admission:   Prior to Admission medications    Medication Sig Start Date End Date Taking? Authorizing Provider   ondansetron (ZOFRAN-ODT) 4 MG disintegrating tablet Take 1 tablet by mouth 3 times daily as needed for Nausea or Vomiting 25  Yes Verito Brown APRN - CNP   traZODone (DESYREL) 100 MG tablet Take 1 tablet by mouth nightly 3/13/25  Yes Whitneyhéctor Farhan ROCK,    fluticasone-salmeterol (ADVAIR DISKUS) 500-50 MCG/ACT AEPB diskus inhaler Inhale 1 puff into the lungs in the morning and 1 puff in the evening. 24  Yes Farhan Quijano DO   estradiol (ESTRACE) 1 MG tablet Take 1 tablet by mouth daily 10/15/24  Yes Manasa Mckeon MD   diphenhydrAMINE (BENADRYL) 25 MG tablet Take 1 tablet by mouth at bedtime   Yes Provider, MD Kun   albuterol sulfate HFA (VENTOLIN HFA) 108 (90 Base) MCG/ACT inhaler Inhale 2 puffs into the lungs 4 times daily as needed for Wheezing 3/19/24  Yes Verito Brown APRN - CNP   azithromycin (ZITHROMAX) 500 MG tablet Take 1 tablet by mouth daily for 3 days 25  Tasha Jackson APRN - CNP   lipase-protease-amylase (CREON) 57285-997847 units CPEP delayed release capsule Take 1 capsule by mouth See Admin Instructions Take 2 capsules at the first bite of meals. Take 1 capsule with snacks. Take at the first bite of food. 25   Tasha Jackson APRN - CNP   cetirizine (ZYRTEC) 5 MG tablet Take 1 tablet by mouth daily  Patient not taking: Reported on 7/10/2025    ProviderKun MD       Current medications:    Current Facility-Administered Medications   Medication Dose Route Frequency Provider Last Rate Last Admin

## 2025-07-15 NOTE — DISCHARGE INSTRUCTIONS
Gastrointestinal Esophagogastroduodenoscopy (EGD) - Upper Exam Discharge Instructions    1. Call Dr. Tillman at [phone] for any problems or questions.    2. Contact the doctor's office for follow up appointment as directed.    3. Medication may cause drowsiness for several hours, therefore:  Do not drive or operate machinery for remainder of the day.    No alcohol today.  Do not make any important or legal decisions for 24 hours.  Do not sign any legal documents for 24 hours.    5. Ordinarily, you may resume regular diet and activity after exam unless otherwise specified by your physician.    6. For mild soreness in your throat you may use Cepacol throat lozenges or warm salt-water gargles as needed.    7. Because of air put into your stomach during exam, you may experience some abdominal distension and nausea, relieved by the passage       gas, for several hours.    Any additional instructions:  Recommendation: Resume previous diet. Continue present medications. Patient has a contact number available for emergencies. The signs and symptoms of potential delayed complications were discussed with the patient. Return to normal activities tomorrow. Written discharge instructions were provided to the patient. Await for pathology letter in the next 1-2 weeks through [employer], if not signed up for [employer], we will mail the results letter to your address. Start using omeprazole 40 mg twice daily (30 min before breakfast and dinner) for 8 weeks followed by omeprazole 40 mg daily. We will arrange you a follow up visit with Tasha in 3-4 weeks. Recommend drinking ginger tea twice daily. Also try ginger gummy or tablets for nausea as needed

## 2025-07-15 NOTE — ANESTHESIA POSTPROCEDURE EVALUATION
Department of Anesthesiology  Postprocedure Note    Patient: Amber Sanchez  MRN: [id number]  YOB: 1990  Date of evaluation: 7/15/2025    Procedure Summary       Date: 07/15/25 Room / Location: [facility] 05 / [facility] ENDOSCOPY    Anesthesia Start: 1118 Anesthesia Stop: 1140    Procedure: ESOPHAGOGASTRODUODENOSCOPY BIOPSY (Upper GI Region) Diagnosis:       Nausea and vomiting      Abdominal pain      (Nausea and vomiting [R11.2])      (Abdominal pain [R10.9])    Surgeons: Greyson Tillman MD Responsible Provider: Toy Mann MD    Anesthesia Type: TIVA ASA Status: 3            Anesthesia Type: TIVA    Laurel Phase I: Laurel Score: 10    Laurel Phase II: Laurel Score: 9    Anesthesia Post Evaluation    Patient location during evaluation: PACU  Patient participation: complete - patient participated  Level of consciousness: awake and alert  Airway patency: patent  Nausea & Vomiting: no nausea and no vomiting  Cardiovascular status: hemodynamically stable  Respiratory status: acceptable, nonlabored ventilation and spontaneous ventilation  Hydration status: euvolemic  Comments: BP 99/61   Pulse 81   Temp 97.7 °F (36.5 °C) (Skin)   Resp 16   Ht 1.6 m (5' 3\")   Wt 90.7 kg (200 lb)   SpO2 97%   BMI 35.43 kg/m²     Multimodal analgesia pain management approach  Pain management: adequate and satisfactory to patient    No notable events documented.

## 2025-07-15 NOTE — H&P
Amber Sanchez (:  1990) is a 34 y.o. female new patient referred to our office for evaluation of the following chief complaint(s):  New Patient (N/V, mostly last week improved over weekend since has returned. Fluctuating between diarrhea and constipation over last week. fHx of diverticulitis )           Assessment & Plan  ASSESSMENT/PLAN:  Nausea and vomiting:  EGD with biopsies to rule out celiac and H. Pylori  Zofran 4mg PRN  Diarrhea:  Add on GI panel, calprotectin, fecal elastase, CRP, ESR stool studies  Abdominal pain:  EGD with biopsies  Stool stuides     Assessment & Plan  1. Nausea, vomiting, and diarrhea:  - Schedule upper endoscopy to rule out H. pylori infection or reflux disease.  - Conduct additional stool studies to check for inflammation indicative of Crohn's disease.  - Order two blood tests to assess for inflammation.  - Consume ginger ale or ginger tea for nausea relief.  - Maintain adequate hydration with Gatorade or IV fluids containing electrolytes.  - Continue using Zofran for nausea.  - Use Imodium for diarrhea if necessary.     Results  Labs   - Stool studies: Normal        Lab Results   Component Value Date     HGB 13.5 2025     WBC 10.0 2025      2025     MCV 86.4 2025     CREATININE 0.86 2025     ALT 33 2025     AST 23 2025         No follow-ups on file.           Subjective  SUBJECTIVE/OBJECTIVE  Amber Sanchez is a 34 y.o. year old female referred to our office for evaluation of nausea, vomiting and diarrhea. Referral note reviewed from 25.  Prior pertinent GI evaluation includes:     -CT ABDOMEN PELVIS WO CONTRAST 2025  Impression       1. No evidence of an acute abdominal or pelvic abnormality on noncontrast CT.  2. Diffuse hepatic steatosis.     History of Present Illness  The patient presents for evaluation of nausea, vomiting, and diarrhea.     She experienced nausea, vomiting, and diarrhea starting last

## 2025-07-17 ENCOUNTER — TELEPHONE (OUTPATIENT)
Dept: GASTROENTEROLOGY | Age: 35
End: 2025-07-17

## 2025-07-17 NOTE — TELEPHONE ENCOUNTER
Patient called in with a medication question ,PA submitted prescription but pharmacy needs to have it resent in with the daily doses for it,not the antibiotic medication.     Patient also needs a work excuse  stating she can return to work full duty and  that she is not contagious . Would like a call back today.

## 2025-07-21 ENCOUNTER — TELEPHONE (OUTPATIENT)
Dept: GASTROENTEROLOGY | Age: 35
End: 2025-07-21

## 2025-07-21 ENCOUNTER — HOSPITAL ENCOUNTER (INPATIENT)
Age: 35
LOS: 3 days | Discharge: HOME | DRG: 392 | End: 2025-07-24
Attending: EMERGENCY MEDICINE | Admitting: INTERNAL MEDICINE
Payer: COMMERCIAL

## 2025-07-21 ENCOUNTER — APPOINTMENT (OUTPATIENT)
Dept: CT IMAGING | Age: 35
DRG: 392 | End: 2025-07-21
Payer: COMMERCIAL

## 2025-07-21 DIAGNOSIS — R19.7 DIARRHEA, UNSPECIFIED TYPE: ICD-10-CM

## 2025-07-21 DIAGNOSIS — R10.30 LOWER ABDOMINAL PAIN: ICD-10-CM

## 2025-07-21 DIAGNOSIS — K52.9 CHRONIC DIARRHEA: ICD-10-CM

## 2025-07-21 DIAGNOSIS — K52.9 COLITIS: Primary | ICD-10-CM

## 2025-07-21 LAB
ALBUMIN SERPL-MCNC: 3.9 G/DL (ref 3.5–5)
ALBUMIN/GLOB SERPL: 1 (ref 1–1.9)
ALP SERPL-CCNC: 90 U/L (ref 35–104)
ALT SERPL-CCNC: 35 U/L (ref 8–45)
ANION GAP SERPL CALC-SCNC: 14 MMOL/L (ref 7–16)
APPEARANCE UR: ABNORMAL
AST SERPL-CCNC: 27 U/L (ref 15–37)
BACTERIA URNS QL MICRO: ABNORMAL /HPF
BASOPHILS # BLD: 0.05 K/UL (ref 0–0.2)
BASOPHILS NFR BLD: 0.6 % (ref 0–2)
BILIRUB SERPL-MCNC: 0.3 MG/DL (ref 0–1.2)
BILIRUB UR QL: NEGATIVE
BUN SERPL-MCNC: 7 MG/DL (ref 6–23)
C COLI+JEJUNI TUF STL QL NAA+PROBE: NOT DETECTED
C. DIFFICILE TOXIN MOLECULAR: NEGATIVE
CALCIUM SERPL-MCNC: 9.6 MG/DL (ref 8.8–10.2)
CHLORIDE SERPL-SCNC: 107 MMOL/L (ref 98–107)
CO2 SERPL-SCNC: 21 MMOL/L (ref 20–29)
COLOR UR: ABNORMAL
CREAT SERPL-MCNC: 0.95 MG/DL (ref 0.6–1.1)
CRYPTOSPORIDIUM PCR: NOT DETECTED
DIFFERENTIAL METHOD BLD: NORMAL
EC STX1+STX2 GENES STL QL NAA+PROBE: NOT DETECTED
ENTAMOEBA HISTOLYTICA PCR: NOT DETECTED
EOSINOPHIL # BLD: 0.05 K/UL (ref 0–0.8)
EOSINOPHIL NFR BLD: 0.6 % (ref 0.5–7.8)
EPI CELLS #/AREA URNS HPF: ABNORMAL /HPF
ERYTHROCYTE [DISTWIDTH] IN BLOOD BY AUTOMATED COUNT: 13 % (ref 11.9–14.6)
ETEC ELTA+ESTB GENES STL QL NAA+PROBE: NOT DETECTED
GIARDIA LAMBLIA PCR: NOT DETECTED
GLOBULIN SER CALC-MCNC: 3.8 G/DL (ref 2.3–3.5)
GLUCOSE SERPL-MCNC: 121 MG/DL (ref 70–99)
GLUCOSE UR STRIP.AUTO-MCNC: NEGATIVE MG/DL
HCG SERPL QL: NEGATIVE
HCT VFR BLD AUTO: 42.5 % (ref 35.8–46.3)
HGB BLD-MCNC: 13.9 G/DL (ref 11.7–15.4)
HGB UR QL STRIP: NEGATIVE
IMM GRANULOCYTES # BLD AUTO: 0.03 K/UL (ref 0–0.5)
IMM GRANULOCYTES NFR BLD AUTO: 0.4 % (ref 0–5)
KETONES UR QL STRIP.AUTO: 40 MG/DL
LEUKOCYTE ESTERASE UR QL STRIP.AUTO: ABNORMAL
LIPASE SERPL-CCNC: 17 U/L (ref 13–60)
LYMPHOCYTES # BLD: 2.38 K/UL (ref 0.5–4.6)
LYMPHOCYTES NFR BLD: 27.9 % (ref 13–44)
MCH RBC QN AUTO: 28.2 PG (ref 26.1–32.9)
MCHC RBC AUTO-ENTMCNC: 32.7 G/DL (ref 31.4–35)
MCV RBC AUTO: 86.2 FL (ref 82–102)
MONOCYTES # BLD: 0.41 K/UL (ref 0.1–1.3)
MONOCYTES NFR BLD: 4.8 % (ref 4–12)
MUCOUS THREADS URNS QL MICRO: ABNORMAL /LPF
NEUTS SEG # BLD: 5.61 K/UL (ref 1.7–8.2)
NEUTS SEG NFR BLD: 65.7 % (ref 43–78)
NITRITE UR QL STRIP.AUTO: NEGATIVE
NRBC # BLD: 0 K/UL (ref 0–0.2)
OTHER OBSERVATIONS: ABNORMAL
P SHIGELLOIDES DNA STL QL NAA+PROBE: NOT DETECTED
PH UR STRIP: 5.5 (ref 5–9)
PLATELET # BLD AUTO: 310 K/UL (ref 150–450)
PMV BLD AUTO: 10.9 FL (ref 9.4–12.3)
POTASSIUM SERPL-SCNC: 4.1 MMOL/L (ref 3.5–5.1)
PROT SERPL-MCNC: 7.6 G/DL (ref 6.3–8.2)
PROT UR STRIP-MCNC: ABNORMAL MG/DL
RBC # BLD AUTO: 4.93 M/UL (ref 4.05–5.2)
SALMONELLA SP SPAO STL QL NAA+PROBE: NOT DETECTED
SHIGELLA SP+EIEC IPAH STL QL NAA+PROBE: NOT DETECTED
SODIUM SERPL-SCNC: 143 MMOL/L (ref 136–145)
SP GR UR REFRACTOMETRY: 1.02 (ref 1–1.02)
UROBILINOGEN UR QL STRIP.AUTO: 0.2 EU/DL (ref 0.2–1)
V CHOL+PARA+VUL DNA STL QL NAA+NON-PROBE: NOT DETECTED
WBC # BLD AUTO: 8.5 K/UL (ref 4.3–11.1)
WBC URNS QL MICRO: ABNORMAL /HPF
Y ENTEROCOL DNA STL QL NAA+NON-PROBE: NOT DETECTED

## 2025-07-21 PROCEDURE — 74177 CT ABD & PELVIS W/CONTRAST: CPT

## 2025-07-21 PROCEDURE — 80053 COMPREHEN METABOLIC PANEL: CPT

## 2025-07-21 PROCEDURE — 2580000003 HC RX 258: Performed by: PHYSICIAN ASSISTANT

## 2025-07-21 PROCEDURE — 6370000000 HC RX 637 (ALT 250 FOR IP): Performed by: INTERNAL MEDICINE

## 2025-07-21 PROCEDURE — 85025 COMPLETE CBC W/AUTO DIFF WBC: CPT

## 2025-07-21 PROCEDURE — 6360000002 HC RX W HCPCS: Performed by: PHYSICIAN ASSISTANT

## 2025-07-21 PROCEDURE — 2580000003 HC RX 258: Performed by: INTERNAL MEDICINE

## 2025-07-21 PROCEDURE — 87086 URINE CULTURE/COLONY COUNT: CPT

## 2025-07-21 PROCEDURE — 99285 EMERGENCY DEPT VISIT HI MDM: CPT

## 2025-07-21 PROCEDURE — 84703 CHORIONIC GONADOTROPIN ASSAY: CPT

## 2025-07-21 PROCEDURE — 6360000004 HC RX CONTRAST MEDICATION: Performed by: PHYSICIAN ASSISTANT

## 2025-07-21 PROCEDURE — 81001 URINALYSIS AUTO W/SCOPE: CPT

## 2025-07-21 PROCEDURE — 1100000000 HC RM PRIVATE

## 2025-07-21 PROCEDURE — 6360000002 HC RX W HCPCS: Performed by: NURSE PRACTITIONER

## 2025-07-21 PROCEDURE — 87493 C DIFF AMPLIFIED PROBE: CPT

## 2025-07-21 PROCEDURE — 6360000002 HC RX W HCPCS: Performed by: INTERNAL MEDICINE

## 2025-07-21 PROCEDURE — 96375 TX/PRO/DX INJ NEW DRUG ADDON: CPT

## 2025-07-21 PROCEDURE — 2500000003 HC RX 250 WO HCPCS: Performed by: PHYSICIAN ASSISTANT

## 2025-07-21 PROCEDURE — 96374 THER/PROPH/DIAG INJ IV PUSH: CPT

## 2025-07-21 PROCEDURE — 6370000000 HC RX 637 (ALT 250 FOR IP): Performed by: PHYSICIAN ASSISTANT

## 2025-07-21 PROCEDURE — 2500000003 HC RX 250 WO HCPCS: Performed by: INTERNAL MEDICINE

## 2025-07-21 PROCEDURE — 83690 ASSAY OF LIPASE: CPT

## 2025-07-21 RX ORDER — DIPHENHYDRAMINE HYDROCHLORIDE 50 MG/ML
25 INJECTION, SOLUTION INTRAMUSCULAR; INTRAVENOUS
Status: COMPLETED | OUTPATIENT
Start: 2025-07-21 | End: 2025-07-21

## 2025-07-21 RX ORDER — FAMOTIDINE 20 MG/1
10 TABLET, FILM COATED ORAL DAILY PRN
Status: DISCONTINUED | OUTPATIENT
Start: 2025-07-21 | End: 2025-07-24 | Stop reason: HOSPADM

## 2025-07-21 RX ORDER — POTASSIUM CHLORIDE 7.45 MG/ML
10 INJECTION INTRAVENOUS PRN
Status: DISCONTINUED | OUTPATIENT
Start: 2025-07-21 | End: 2025-07-24 | Stop reason: HOSPADM

## 2025-07-21 RX ORDER — SODIUM CHLORIDE 9 MG/ML
INJECTION, SOLUTION INTRAVENOUS CONTINUOUS
Status: DISCONTINUED | OUTPATIENT
Start: 2025-07-21 | End: 2025-07-24 | Stop reason: HOSPADM

## 2025-07-21 RX ORDER — ACETAMINOPHEN 650 MG/1
650 SUPPOSITORY RECTAL EVERY 6 HOURS PRN
Status: DISCONTINUED | OUTPATIENT
Start: 2025-07-21 | End: 2025-07-24 | Stop reason: HOSPADM

## 2025-07-21 RX ORDER — ONDANSETRON 4 MG/1
4 TABLET, ORALLY DISINTEGRATING ORAL EVERY 8 HOURS PRN
Status: DISCONTINUED | OUTPATIENT
Start: 2025-07-21 | End: 2025-07-24 | Stop reason: HOSPADM

## 2025-07-21 RX ORDER — ENOXAPARIN SODIUM 100 MG/ML
40 INJECTION SUBCUTANEOUS DAILY
Status: DISCONTINUED | OUTPATIENT
Start: 2025-07-21 | End: 2025-07-24 | Stop reason: HOSPADM

## 2025-07-21 RX ORDER — SODIUM CHLORIDE 9 MG/ML
INJECTION, SOLUTION INTRAVENOUS PRN
Status: DISCONTINUED | OUTPATIENT
Start: 2025-07-21 | End: 2025-07-24 | Stop reason: HOSPADM

## 2025-07-21 RX ORDER — METRONIDAZOLE 500 MG/1
500 TABLET ORAL
Status: COMPLETED | OUTPATIENT
Start: 2025-07-21 | End: 2025-07-21

## 2025-07-21 RX ORDER — ONDANSETRON 2 MG/ML
4 INJECTION INTRAMUSCULAR; INTRAVENOUS EVERY 6 HOURS PRN
Status: DISCONTINUED | OUTPATIENT
Start: 2025-07-21 | End: 2025-07-24 | Stop reason: HOSPADM

## 2025-07-21 RX ORDER — MORPHINE SULFATE 4 MG/ML
4 INJECTION, SOLUTION INTRAMUSCULAR; INTRAVENOUS
Refills: 0 | Status: COMPLETED | OUTPATIENT
Start: 2025-07-21 | End: 2025-07-21

## 2025-07-21 RX ORDER — MAGNESIUM SULFATE IN WATER 40 MG/ML
2000 INJECTION, SOLUTION INTRAVENOUS PRN
Status: DISCONTINUED | OUTPATIENT
Start: 2025-07-21 | End: 2025-07-24 | Stop reason: HOSPADM

## 2025-07-21 RX ORDER — SODIUM CHLORIDE 0.9 % (FLUSH) 0.9 %
5-40 SYRINGE (ML) INJECTION EVERY 12 HOURS SCHEDULED
Status: DISCONTINUED | OUTPATIENT
Start: 2025-07-21 | End: 2025-07-24 | Stop reason: HOSPADM

## 2025-07-21 RX ORDER — SODIUM CHLORIDE, SODIUM LACTATE, POTASSIUM CHLORIDE, AND CALCIUM CHLORIDE .6; .31; .03; .02 G/100ML; G/100ML; G/100ML; G/100ML
1000 INJECTION, SOLUTION INTRAVENOUS ONCE
Status: COMPLETED | OUTPATIENT
Start: 2025-07-21 | End: 2025-07-21

## 2025-07-21 RX ORDER — BISACODYL 10 MG
10 SUPPOSITORY, RECTAL RECTAL DAILY PRN
Status: DISCONTINUED | OUTPATIENT
Start: 2025-07-21 | End: 2025-07-24 | Stop reason: HOSPADM

## 2025-07-21 RX ORDER — TRAZODONE HYDROCHLORIDE 50 MG/1
100 TABLET ORAL NIGHTLY
Status: DISCONTINUED | OUTPATIENT
Start: 2025-07-21 | End: 2025-07-24 | Stop reason: HOSPADM

## 2025-07-21 RX ORDER — MAGNESIUM HYDROXIDE/ALUMINUM HYDROXICE/SIMETHICONE 120; 1200; 1200 MG/30ML; MG/30ML; MG/30ML
30 SUSPENSION ORAL EVERY 6 HOURS PRN
Status: DISCONTINUED | OUTPATIENT
Start: 2025-07-21 | End: 2025-07-24 | Stop reason: HOSPADM

## 2025-07-21 RX ORDER — 0.9 % SODIUM CHLORIDE 0.9 %
1000 INTRAVENOUS SOLUTION INTRAVENOUS ONCE
Status: COMPLETED | OUTPATIENT
Start: 2025-07-21 | End: 2025-07-21

## 2025-07-21 RX ORDER — MORPHINE SULFATE 4 MG/ML
4 INJECTION INTRAVENOUS
Refills: 0 | Status: COMPLETED | OUTPATIENT
Start: 2025-07-21 | End: 2025-07-21

## 2025-07-21 RX ORDER — SODIUM CHLORIDE 0.9 % (FLUSH) 0.9 %
5-40 SYRINGE (ML) INJECTION PRN
Status: DISCONTINUED | OUTPATIENT
Start: 2025-07-21 | End: 2025-07-24 | Stop reason: HOSPADM

## 2025-07-21 RX ORDER — POTASSIUM CHLORIDE 1500 MG/1
40 TABLET, EXTENDED RELEASE ORAL PRN
Status: DISCONTINUED | OUTPATIENT
Start: 2025-07-21 | End: 2025-07-24 | Stop reason: HOSPADM

## 2025-07-21 RX ORDER — POLYETHYLENE GLYCOL 3350 17 G/17G
17 POWDER, FOR SOLUTION ORAL DAILY PRN
Status: DISCONTINUED | OUTPATIENT
Start: 2025-07-21 | End: 2025-07-24 | Stop reason: HOSPADM

## 2025-07-21 RX ORDER — METHYLPREDNISOLONE SODIUM SUCCINATE 125 MG/2ML
125 INJECTION INTRAMUSCULAR; INTRAVENOUS
Status: DISCONTINUED | OUTPATIENT
Start: 2025-07-21 | End: 2025-07-21

## 2025-07-21 RX ORDER — ONDANSETRON 2 MG/ML
4 INJECTION INTRAMUSCULAR; INTRAVENOUS ONCE
Status: COMPLETED | OUTPATIENT
Start: 2025-07-21 | End: 2025-07-21

## 2025-07-21 RX ORDER — ACETAMINOPHEN 325 MG/1
650 TABLET ORAL EVERY 6 HOURS PRN
Status: DISCONTINUED | OUTPATIENT
Start: 2025-07-21 | End: 2025-07-24 | Stop reason: HOSPADM

## 2025-07-21 RX ORDER — ESTRADIOL 1 MG/1
1 TABLET ORAL DAILY
Status: DISCONTINUED | OUTPATIENT
Start: 2025-07-22 | End: 2025-07-24 | Stop reason: HOSPADM

## 2025-07-21 RX ORDER — IOPAMIDOL 755 MG/ML
100 INJECTION, SOLUTION INTRAVASCULAR
Status: COMPLETED | OUTPATIENT
Start: 2025-07-21 | End: 2025-07-21

## 2025-07-21 RX ADMIN — METHYLPREDNISOLONE SODIUM SUCCINATE 125 MG: 125 INJECTION INTRAMUSCULAR; INTRAVENOUS at 13:05

## 2025-07-21 RX ADMIN — ONDANSETRON 4 MG: 2 INJECTION, SOLUTION INTRAMUSCULAR; INTRAVENOUS at 13:04

## 2025-07-21 RX ADMIN — SODIUM CHLORIDE: 0.9 INJECTION, SOLUTION INTRAVENOUS at 19:45

## 2025-07-21 RX ADMIN — DIPHENHYDRAMINE HYDROCHLORIDE 25 MG: 50 INJECTION INTRAMUSCULAR; INTRAVENOUS at 17:58

## 2025-07-21 RX ADMIN — TRAZODONE HYDROCHLORIDE 100 MG: 50 TABLET ORAL at 20:55

## 2025-07-21 RX ADMIN — MORPHINE SULFATE 4 MG: 4 INJECTION INTRAVENOUS at 13:04

## 2025-07-21 RX ADMIN — DIPHENHYDRAMINE HYDROCHLORIDE 25 MG: 50 INJECTION INTRAMUSCULAR; INTRAVENOUS at 13:04

## 2025-07-21 RX ADMIN — ENOXAPARIN SODIUM 40 MG: 100 INJECTION SUBCUTANEOUS at 20:57

## 2025-07-21 RX ADMIN — ACETAMINOPHEN 650 MG: 325 TABLET ORAL at 20:55

## 2025-07-21 RX ADMIN — SODIUM CHLORIDE, SODIUM LACTATE, POTASSIUM CHLORIDE, AND CALCIUM CHLORIDE 1000 ML: .6; .31; .03; .02 INJECTION, SOLUTION INTRAVENOUS at 18:16

## 2025-07-21 RX ADMIN — SODIUM CHLORIDE, PRESERVATIVE FREE 10 ML: 5 INJECTION INTRAVENOUS at 20:57

## 2025-07-21 RX ADMIN — IOPAMIDOL 100 ML: 755 INJECTION, SOLUTION INTRAVENOUS at 13:25

## 2025-07-21 RX ADMIN — METRONIDAZOLE 500 MG: 500 TABLET ORAL at 17:58

## 2025-07-21 RX ADMIN — ONDANSETRON 4 MG: 2 INJECTION INTRAMUSCULAR; INTRAVENOUS at 18:31

## 2025-07-21 RX ADMIN — WATER 1000 MG: 1 INJECTION INTRAMUSCULAR; INTRAVENOUS; SUBCUTANEOUS at 17:58

## 2025-07-21 RX ADMIN — VANCOMYCIN HYDROCHLORIDE 2000 MG: 10 INJECTION, POWDER, LYOPHILIZED, FOR SOLUTION INTRAVENOUS at 22:10

## 2025-07-21 RX ADMIN — SODIUM CHLORIDE 1000 ML: 0.9 INJECTION, SOLUTION INTRAVENOUS at 13:04

## 2025-07-21 RX ADMIN — MORPHINE SULFATE 4 MG: 4 INJECTION INTRAVENOUS at 22:32

## 2025-07-21 ASSESSMENT — PAIN DESCRIPTION - ORIENTATION
ORIENTATION: MID
ORIENTATION: MID

## 2025-07-21 ASSESSMENT — PAIN SCALES - GENERAL
PAINLEVEL_OUTOF10: 3
PAINLEVEL_OUTOF10: 8
PAINLEVEL_OUTOF10: 5
PAINLEVEL_OUTOF10: 2
PAINLEVEL_OUTOF10: 8
PAINLEVEL_OUTOF10: 5

## 2025-07-21 ASSESSMENT — PAIN - FUNCTIONAL ASSESSMENT
PAIN_FUNCTIONAL_ASSESSMENT: ACTIVITIES ARE NOT PREVENTED
PAIN_FUNCTIONAL_ASSESSMENT: ACTIVITIES ARE NOT PREVENTED
PAIN_FUNCTIONAL_ASSESSMENT: 0-10

## 2025-07-21 ASSESSMENT — PAIN DESCRIPTION - FREQUENCY
FREQUENCY: INTERMITTENT
FREQUENCY: INTERMITTENT

## 2025-07-21 ASSESSMENT — PAIN DESCRIPTION - LOCATION
LOCATION: ABDOMEN

## 2025-07-21 ASSESSMENT — PAIN DESCRIPTION - PAIN TYPE
TYPE: ACUTE PAIN
TYPE: ACUTE PAIN

## 2025-07-21 ASSESSMENT — PAIN DESCRIPTION - ONSET
ONSET: GRADUAL
ONSET: GRADUAL

## 2025-07-21 ASSESSMENT — PAIN DESCRIPTION - DESCRIPTORS
DESCRIPTORS: CRAMPING
DESCRIPTORS: CRAMPING

## 2025-07-21 NOTE — TELEPHONE ENCOUNTER
Pt called stating that she was diagnosed with E. Coli and has not gotten any better.  This has been going on for 3 weeks with increased nausea, vomiting, and diarrhea.  Pt states she is very weak and can \"barely hold her head up\".  Pt states she went to the bathroom probably 20 times yesterday.    Pt was advised that she should report to the ER for work up and possible admission.  She was encouraged to report to [facility] if she could, but to go to whatever ER is closest to her.    She was instructed that we would forward this message to Tasha's nurse and to Tasha.

## 2025-07-21 NOTE — PROGRESS NOTES
TRANSFER - IN REPORT:    Verbal report received from Zena HANKS on Amber Sanchez  being received from ED for routine progression of patient care      Report consisted of patient's Situation, Background, Assessment and   Recommendations(SBAR).     Information from the following report(s) Nurse Handoff Report was reviewed with the receiving nurse.    Opportunity for questions and clarification was provided.      Assessment will be completed upon patient's arrival to unit and care assumed.

## 2025-07-21 NOTE — H&P
Hospitalist History and Physical   Admit Date:  2025 11:46 AM   Name:  Amber Sanchez   Age:  34 y.o.  Sex:  female  :  1990   MRN:  [id number]   Room:      Presenting/Chief Complaint: Abdominal Pain     Reason(s) for Admission: Colitis [K52.9]  Lower abdominal pain [R10.30]  Intractable diarrhea [R19.7]  Diarrhea, unspecified type [R19.7]     History of Present Illness:   Amber Sanchez is a 34 y.o. female with no significant past medical history who presents emergency room with ongoing watery diarrhea for past 19 days.  Patient reports abdominal pain associated with diarrhea.  Abdominal pain subsides 15 to 20 minutes after having a bowel movement.  Patient has been having multiple watery bowel movement daily.  She had 3 episodes since arrival to the ED.  Patient was seen by GI and recently tested positive for E. coli in her stool.  Patient was started on trial of pancreatic enzyme, Pepcid/omeprazole without much relief.    In the emergency room, patient was initially hypertensive to 130/101.  Laboratory workup was unremarkable.  Urinalysis with trace leukocyte Estrace, 2+ bacteria but also with 10-20 epithelial cells.  CT abdomen pelvis with mild focal inflammation of the distal aspect of cecum without appendicitis.    Assessment & Plan:     Intractable diarrhea  Recent history of E. coli infection  -Given patient's ongoing watery diarrhea for about 19 days, will consult GI  -IV fluids  -Empiric IV antibiotics with Flagyl and Rocephin  -Will order stool C. difficile test as well as ova and parasite and gi panel  -If C. difficile is negative, will give Imodium as needed    Insomnia: Trazodone at bedtime    PT/OT evals ordered?  Not ordered; patient not expected to need rehab  Diet: CLD  VTE prophylaxis: Lovenox  Code status: FULL      Non-peripheral Lines and Tubes (if present):             Hospital Problems:  Principal Problem:    Intractable diarrhea  Active Problems:     No abnormal movements/Other

## 2025-07-21 NOTE — ED NOTES
TRANSFER - OUT REPORT:    Verbal report given to Eli HANKS on Amber Sanchez  being transferred to [facility] for routine progression of patient care       Report consisted of patient's Situation, Background, Assessment and   Recommendations(SBAR).     Information from the following report(s) ED Encounter Summary was reviewed with the receiving nurse.    B[place] Fall Assessment:    Presents to emergency department  because of falls (Syncope, seizure, or loss of consciousness): No  Age > 70: No  Altered Mental Status, Intoxication with alcohol or substance confusion (Disorientation, impaired judgment, poor safety awaremess, or inability to follow instructions): No  Impaired Mobility: Ambulates or transfers with assistive devices or assistance; Unable to ambulate or transer.: No  Nursing Judgement: No          Lines:   Peripheral IV 07/21/25 Proximal;Right Forearm (Active)        Opportunity for questions and clarification was provided.      Patient transported with:  Zena Ray RN  07/21/25 0222

## 2025-07-21 NOTE — ED PROVIDER NOTES
Emergency Department Provider Note       PCP: Farhan Quijano DO   Age: 34 y.o.   Sex: female     DISPOSITION Admitted 07/21/2025 04:45:41 PM                ICD-10-CM    1. Colitis  K52.9       2. Lower abdominal pain  R10.30       3. Diarrhea, unspecified type  R19.7           Medical Decision Making     Patient presents to the ER with complaint of abdominal pain, nausea, vomiting and diarrhea that has been ongoing for 19 days.  Patient states she tested positive for E. coli and was treated.  States she has not had another stool culture since then.  States in the last 24 hours she has had 30 episodes of diarrhea and feels very weak.  Patient states she does get itchy with IV contrast media, however, if she is premedicated she can have the IV contrast.  Labs and imaging ordered.  IV fluids, IV morphine 4 mg and IV Zofran 4 mg ordered.  Premedication with Solu-Medrol 125 mg and Benadryl 25 mg ordered.  Patient states she is taking Pepcid and Protonix.    White blood cell count is normal.  Glucose is 121.  Lipase is normal.  Urinalysis demonstrates 40 ketones and trace leukocytes.    CT abdomen pelvis demonstrates colitis in the distal aspect of the cecum without appendicitis.    Discussed results with patient.  Patient is still not feeling better.  Patient is unable to tolerate p.o. fluids.  Will admit.    Discussed case with Dr. Polk, ER attending who agrees with plan.    Discussed case with hospitalist, Dr. Chang Pagan through [facility] who agrees to admit.     1 acute complicated illness or injury.  Shared medical decision making was utilized in creating the patients health plan today.  I independently ordered and reviewed each unique test.    I reviewed external records: ED visit note from a different ED.            The patient was admitted and I have discussed patient management with the admitting provider.          History     34-year-old female presents to the ER with complaint of abdominal pain,  nausea, vomiting and diarrhea that has been ongoing for 19 days.  States in the last 24 hours she has had 30 episodes of diarrhea and feels very weak.  Patient states she tested positive for E. coli.  States she has taken antibiotics, had a EGD.  Patient states she called her GI doctor who told her to come here for further testing and possible admission.    The history is provided by the patient.     Physical Exam     Vitals signs and nursing note reviewed:  Vitals:    07/21/25 1113   BP: (!) 130/101   Pulse: 89   Resp: 17   Temp: 97.7 °F (36.5 °C)   SpO2: 98%      Physical Exam  Vitals and nursing note reviewed.   Constitutional:       General: She is not in acute distress.     Appearance: Normal appearance. She is not toxic-appearing.   HENT:      Head: Normocephalic and atraumatic.      Nose: Nose normal.      Mouth/Throat:      Mouth: Mucous membranes are moist.      Pharynx: Oropharynx is clear.   Eyes:      Conjunctiva/sclera: Conjunctivae normal.      Pupils: Pupils are equal, round, and reactive to light.   Cardiovascular:      Rate and Rhythm: Normal rate and regular rhythm.      Heart sounds: Normal heart sounds.   Pulmonary:      Effort: Pulmonary effort is normal. No respiratory distress.      Breath sounds: Normal breath sounds. No stridor. No wheezing, rhonchi or rales.   Abdominal:      General: Abdomen is flat. Bowel sounds are normal. There is no distension.      Palpations: Abdomen is soft.      Tenderness: There is abdominal tenderness in the right upper quadrant, right lower quadrant, periumbilical area, suprapubic area and left lower quadrant. There is no guarding.   Musculoskeletal:         General: No swelling. Normal range of motion.   Skin:     General: Skin is warm and dry.      Findings: No rash.   Neurological:      General: No focal deficit present.      Mental Status: She is alert and oriented to person, place, and time.      Motor: No weakness.      Gait: Gait normal.        Procedures

## 2025-07-21 NOTE — PROGRESS NOTES
4 Eyes Skin Assessment     NAME:  Amber Sanchez  YOB: 1990  MEDICAL RECORD NUMBER:  [id number]    The patient is being assessed for  Admission    I agree that at least one RN has performed a thorough Head to Toe Skin Assessment on the patient. ALL assessment sites listed below have been assessed.      Areas assessed by both nurses:    Sacrum. Buttock, Coccyx, Ischium        Does the Patient have a Wound? No noted wound(s)       Long Prevention initiated by RN: Yes  Wound Care Orders initiated by RN: No    For hospital-acquired stage 1 & 2 and ALL Stage 3,4, Unstageable, DTI, NWPT, and Complex wounds: place order “IP Wound Care/Ostomy Nurse Eval and Treat” by RN under : No    New Ostomies, if present place, Ostomy referral order under : No     Nurse 1 eSignature: Electronically signed by Eli Tracy RN on 7/21/25 at 6:42 PM EDT    **SHARE this note so that the co-signing nurse can place an eSignature**    Nurse 2 eSignature: Electronically signed by Argelia Delarosa RN on 7/21/25 at 7:27 PM EDT

## 2025-07-22 ENCOUNTER — ANESTHESIA EVENT (OUTPATIENT)
Dept: SURGERY | Age: 35
DRG: 392 | End: 2025-07-22
Payer: COMMERCIAL

## 2025-07-22 PROBLEM — K52.9 CHRONIC DIARRHEA: Status: ACTIVE | Noted: 2025-07-21

## 2025-07-22 LAB
ANION GAP SERPL CALC-SCNC: 12 MMOL/L (ref 7–16)
BASOPHILS # BLD: 0.01 K/UL (ref 0–0.2)
BASOPHILS NFR BLD: 0.1 % (ref 0–2)
BUN SERPL-MCNC: 4 MG/DL (ref 6–23)
CALCIUM SERPL-MCNC: 8.6 MG/DL (ref 8.8–10.2)
CHLORIDE SERPL-SCNC: 109 MMOL/L (ref 98–107)
CO2 SERPL-SCNC: 20 MMOL/L (ref 20–29)
CREAT SERPL-MCNC: 0.74 MG/DL (ref 0.6–1.1)
DIFFERENTIAL METHOD BLD: ABNORMAL
EOSINOPHIL # BLD: 0 K/UL (ref 0–0.8)
EOSINOPHIL NFR BLD: 0 % (ref 0.5–7.8)
ERYTHROCYTE [DISTWIDTH] IN BLOOD BY AUTOMATED COUNT: 13.1 % (ref 11.9–14.6)
GLUCOSE SERPL-MCNC: 125 MG/DL (ref 70–99)
HCT VFR BLD AUTO: 38.2 % (ref 35.8–46.3)
HGB BLD-MCNC: 12.5 G/DL (ref 11.7–15.4)
IMM GRANULOCYTES # BLD AUTO: 0.04 K/UL (ref 0–0.5)
IMM GRANULOCYTES NFR BLD AUTO: 0.4 % (ref 0–5)
LYMPHOCYTES # BLD: 1.49 K/UL (ref 0.5–4.6)
LYMPHOCYTES NFR BLD: 16.4 % (ref 13–44)
MCH RBC QN AUTO: 28.3 PG (ref 26.1–32.9)
MCHC RBC AUTO-ENTMCNC: 32.7 G/DL (ref 31.4–35)
MCV RBC AUTO: 86.6 FL (ref 82–102)
MONOCYTES # BLD: 0.47 K/UL (ref 0.1–1.3)
MONOCYTES NFR BLD: 5.2 % (ref 4–12)
NEUTS SEG # BLD: 7.1 K/UL (ref 1.7–8.2)
NEUTS SEG NFR BLD: 77.9 % (ref 43–78)
NRBC # BLD: 0 K/UL (ref 0–0.2)
PLATELET # BLD AUTO: 283 K/UL (ref 150–450)
PMV BLD AUTO: 11.2 FL (ref 9.4–12.3)
POTASSIUM SERPL-SCNC: 3.7 MMOL/L (ref 3.5–5.1)
RBC # BLD AUTO: 4.41 M/UL (ref 4.05–5.2)
SODIUM SERPL-SCNC: 141 MMOL/L (ref 136–145)
WBC # BLD AUTO: 9.1 K/UL (ref 4.3–11.1)

## 2025-07-22 PROCEDURE — 85025 COMPLETE CBC W/AUTO DIFF WBC: CPT

## 2025-07-22 PROCEDURE — 2500000003 HC RX 250 WO HCPCS: Performed by: NURSE PRACTITIONER

## 2025-07-22 PROCEDURE — 6360000002 HC RX W HCPCS: Performed by: NURSE PRACTITIONER

## 2025-07-22 PROCEDURE — 36415 COLL VENOUS BLD VENIPUNCTURE: CPT

## 2025-07-22 PROCEDURE — 76937 US GUIDE VASCULAR ACCESS: CPT

## 2025-07-22 PROCEDURE — 6370000000 HC RX 637 (ALT 250 FOR IP)

## 2025-07-22 PROCEDURE — 6370000000 HC RX 637 (ALT 250 FOR IP): Performed by: INTERNAL MEDICINE

## 2025-07-22 PROCEDURE — 1100000000 HC RM PRIVATE

## 2025-07-22 PROCEDURE — 2580000003 HC RX 258: Performed by: INTERNAL MEDICINE

## 2025-07-22 PROCEDURE — 83993 ASSAY FOR CALPROTECTIN FECAL: CPT

## 2025-07-22 PROCEDURE — 2500000003 HC RX 250 WO HCPCS: Performed by: INTERNAL MEDICINE

## 2025-07-22 PROCEDURE — 6360000002 HC RX W HCPCS: Performed by: INTERNAL MEDICINE

## 2025-07-22 PROCEDURE — 80048 BASIC METABOLIC PNL TOTAL CA: CPT

## 2025-07-22 RX ORDER — DICYCLOMINE HYDROCHLORIDE 10 MG/1
10 CAPSULE ORAL 3 TIMES DAILY PRN
Status: DISCONTINUED | OUTPATIENT
Start: 2025-07-22 | End: 2025-07-24 | Stop reason: HOSPADM

## 2025-07-22 RX ORDER — PROCHLORPERAZINE EDISYLATE 5 MG/ML
10 INJECTION INTRAMUSCULAR; INTRAVENOUS EVERY 6 HOURS PRN
Status: DISCONTINUED | OUTPATIENT
Start: 2025-07-22 | End: 2025-07-24 | Stop reason: HOSPADM

## 2025-07-22 RX ORDER — POLYETHYLENE GLYCOL 3350 17 G/17G
238 POWDER, FOR SOLUTION ORAL ONCE
Status: COMPLETED | OUTPATIENT
Start: 2025-07-22 | End: 2025-07-22

## 2025-07-22 RX ORDER — MORPHINE SULFATE 2 MG/ML
2 INJECTION, SOLUTION INTRAMUSCULAR; INTRAVENOUS ONCE
Status: COMPLETED | OUTPATIENT
Start: 2025-07-22 | End: 2025-07-22

## 2025-07-22 RX ADMIN — POLYETHYLENE GLYCOL 3350 238 G: 17 POWDER, FOR SOLUTION ORAL at 12:41

## 2025-07-22 RX ADMIN — WATER 1000 MG: 1 INJECTION INTRAMUSCULAR; INTRAVENOUS; SUBCUTANEOUS at 16:48

## 2025-07-22 RX ADMIN — SODIUM CHLORIDE: 0.9 INJECTION, SOLUTION INTRAVENOUS at 10:47

## 2025-07-22 RX ADMIN — PANTOPRAZOLE SODIUM 40 MG: 40 INJECTION, POWDER, FOR SOLUTION INTRAVENOUS at 10:42

## 2025-07-22 RX ADMIN — PROCHLORPERAZINE EDISYLATE 10 MG: 5 INJECTION INTRAMUSCULAR; INTRAVENOUS at 21:32

## 2025-07-22 RX ADMIN — ESTRADIOL 1 MG: 1 TABLET ORAL at 08:49

## 2025-07-22 RX ADMIN — ONDANSETRON 4 MG: 2 INJECTION INTRAMUSCULAR; INTRAVENOUS at 10:34

## 2025-07-22 RX ADMIN — ONDANSETRON 4 MG: 4 TABLET, ORALLY DISINTEGRATING ORAL at 04:24

## 2025-07-22 RX ADMIN — MORPHINE SULFATE 2 MG: 2 INJECTION, SOLUTION INTRAMUSCULAR; INTRAVENOUS at 21:32

## 2025-07-22 RX ADMIN — ONDANSETRON 4 MG: 2 INJECTION INTRAMUSCULAR; INTRAVENOUS at 16:46

## 2025-07-22 RX ADMIN — SODIUM CHLORIDE, PRESERVATIVE FREE 10 ML: 5 INJECTION INTRAVENOUS at 21:40

## 2025-07-22 RX ADMIN — TRAZODONE HYDROCHLORIDE 100 MG: 50 TABLET ORAL at 21:40

## 2025-07-22 ASSESSMENT — PAIN SCALES - GENERAL
PAINLEVEL_OUTOF10: 0
PAINLEVEL_OUTOF10: 9
PAINLEVEL_OUTOF10: 2

## 2025-07-22 ASSESSMENT — ENCOUNTER SYMPTOMS
VOMITING: 1
CONSTIPATION: 0
NAUSEA: 1
RESPIRATORY NEGATIVE: 1
DIARRHEA: 1
BLOOD IN STOOL: 0
ABDOMINAL PAIN: 1

## 2025-07-22 ASSESSMENT — PAIN DESCRIPTION - ONSET: ONSET: GRADUAL

## 2025-07-22 ASSESSMENT — PAIN DESCRIPTION - FREQUENCY: FREQUENCY: INTERMITTENT

## 2025-07-22 ASSESSMENT — PAIN DESCRIPTION - ORIENTATION: ORIENTATION: MID

## 2025-07-22 ASSESSMENT — PAIN DESCRIPTION - LOCATION: LOCATION: ABDOMEN

## 2025-07-22 ASSESSMENT — PAIN DESCRIPTION - DESCRIPTORS: DESCRIPTORS: CRAMPING

## 2025-07-22 ASSESSMENT — PAIN DESCRIPTION - PAIN TYPE: TYPE: ACUTE PAIN

## 2025-07-22 ASSESSMENT — PAIN - FUNCTIONAL ASSESSMENT: PAIN_FUNCTIONAL_ASSESSMENT: ACTIVITIES ARE NOT PREVENTED

## 2025-07-22 NOTE — ACP (ADVANCE CARE PLANNING)
Advance Care Planning     Advance Care Planning Activator (Inpatient)  Conversation Note      Date of ACP Conversation: 2025         ACP Activator: Lianet Chacon RN    {When Decision Maker makes decisions on behalf of the incapacitated patient: Decision Maker is asked to consider and make decisions based on patient values, known preferences, or best interests.     Health Care Decision Maker: No LW/HCPOA on file. Not  and no adult children. Father . Mother is legal NOK unless document presented or completed stating otherwise.     Current Designated Health Care Decision Maker:     Primary Decision Maker: Sarah Sanchez - Parent - [phone]  Click here to complete Healthcare Decision Makers including section of the Healthcare Decision Maker Relationship (ie \"Primary\")  Today we documented Decision Maker(s) consistent with Legal Next of Kin hierarchy.    Care Preferences  Full code per MD orders.

## 2025-07-22 NOTE — PROGRESS NOTES
Nutrition Assessment  Assessment Type: Initial, Positive nutrition screen  Reason for visit:  Best Practice Alert: Malnutrition Screening Tool   Malnutrition Screening Tool Score: 2  Unintentional weight loss PTA: 2 to 13 pounds (1 point)  Eating poorly due to decreased appetite: Yes (1 point)    Nutrition Intervention:   Food and/or Nutrient Delivery:   If it is anticipated patient will remain NPO/Clear liquid diet, recommended consider nutrition support if consistent with goals of care in the next 3-5 days.   Consider PN d/t Intractable vomiting or diarrhea  If nutrition support is pursued, place IP Consult Dietitian for Tube feeding order and management or Parenteral order and management.   If nutrition support access is not already in place, order placement of appropriate access device for nutrition support.     Malnutrition Assessment:  Academy/A.S.P.E.N Clinical Malnutrition Criteria  Malnutrition Status: At risk for malnutrition  Context: Acute Illness  Findings of clinical characteristics of malnutrition:   Energy Intake:  50% or less of estimated energy requirements for 5 or more days  Weight Loss:  Mild weight loss (3.7% in the past 2 weeks)     Body Fat Loss:  No body fat loss Triceps, Buccal region   Muscle Mass Loss:  Mild muscle mass loss Temples (temporalis), Clavicles (pectoralis & deltoids), Hand (interosseous), Calf (gastrocnemius)    Nutrition Assessment:  Food/Nutrition Related History: Pt reports decreased intake for the past 20 days. She stated she is unable to keep anything in. She endorses nausea, vomiting, and diarrhea. She stated she is eating soft bland foods such as mashed potatoes because she knows she needs to eat something. Pt denies ONS use.      Do You Have Any Cultural, Uatsdin, or Ethnic Food Preferences?: No   Weight History: Pt endorses wt loss of ~8lbs in the past 20 days. Per EMR wt hx review of internal medicine office visits 11/25 200lb, 1/3 196lb, 7/7 204lb. Pt has lost

## 2025-07-22 NOTE — PROGRESS NOTES
Patient had 2 episodes of watery stools throughout PM shift. She also reported vomiting. PRN Zofran 4mg given per pt request.

## 2025-07-22 NOTE — CARE COORDINATION
Case Management Assessment  Initial Evaluation    Date/Time of Evaluation: 7/22/2025 12:50 PM  Assessment Completed by: Lianet Chacon RN    If patient is discharged prior to next notation, then this note serves as note for discharge by case management.    Patient Name: Amber Sanchez                   YOB: 1990  Diagnosis: Colitis [K52.9]  Lower abdominal pain [R10.30]  Intractable diarrhea [R19.7]  Diarrhea, unspecified type [R19.7]                   Date / Time: 7/21/2025 11:46 AM    Patient Admission Status: Inpatient   Readmission Risk (Low < 19, Mod (19-27), High > 27): Readmission Risk Score: 6.2    Current PCP: Farhan Quijano, DO  PCP verified by CM? (P) Yes    Chart Reviewed: Yes      History Provided by: (P) Patient  Patient Orientation: (P) Alert and Oriented    Patient Cognition: (P) Alert    Hospitalization in the last 30 days (Readmission):  No    If yes, Readmission Assessment in CM Navigator will be completed.    Advance Directives:      Code Status: Full Code   Patient's Primary Decision Maker is: (P) Legal Next of Kin      Discharge Planning:    Patient lives with: (P) Parent Type of Home: (P) House (most likely home)  Primary Care Giver: (P) Self  Patient Support Systems include: (P) Parent, Friends/Neighbors   Current Financial resources: (P) Other (Comment) ([facility])  Current community resources: (P) None  Current services prior to admission: (P) None            Current DME:  none            Type of Home Care services:  None    ADLS  Prior functional level: (P) Independent in ADLs/IADLs  Current functional level: (P) Assistance with the following:    PT AM-PAC:   /24  OT AM-PAC:   /24    Family can provide assistance at DC: (P) Yes  Would you like Case Management to discuss the discharge plan with any other family members/significant others, and if so, who? (P) Yes  Plans to Return to Present Housing: (P) Yes  Other Identified Issues/Barriers to RETURNING to current housing:

## 2025-07-22 NOTE — PERIOP NOTE
Preop department called to notify patient of arrival time for scheduled procedure. Instructions given to   - Arrive at [facility] Entrance [address].  - Nothing to eat after midnight unless otherwise indicated. No gum, mints, or ice chips. You may have clear liquids two hours prior to arrival to the hospital.   - Have a responsible adult to drive patient to the hospital, stay during surgery, and patient will need supervision 24 hours after anesthesia.   - Use antibacterial soap in shower the night before surgery and on the morning of surgery.       Was patient contacted: Eli HANKS on 10th floor  Voicemail left:   Numbers contacted: [phone]   Arrival time: 1030  Time to stop clear liquids:0830

## 2025-07-22 NOTE — PROGRESS NOTES
Resident Progress Note   Admit Date:  2025 11:46 AM   Name:  Amber Sanchez   Age:  34 y.o.  Sex:  female  :  1990   MRN:  [id number]   Room:  /    Presenting Complaint: Abdominal Pain    Reason(s) for Admission: Colitis [K52.9]  Lower abdominal pain [R10.30]  Intractable diarrhea [R19.7]  Diarrhea, unspecified type [R19.7]     Hospital Course:   Amber Sanchez is a 34 y.o. female with no significant past medical history who presents emergency room with ongoing watery diarrhea for past 20 days.  Patient reports abdominal pain associated with diarrhea.  Abdominal pain subsides 15 to 20 minutes after having a bowel movement.  Patient has been having multiple watery bowel movement daily.  Patient was seen by GI and recently tested positive for E. coli in her stool and was on treatment.  Patient was started on trial of pancreatic enzyme, Pepcid/omeprazole without much relief.     In the emergency room, CT abdomen pelvis with mild focal inflammation of the distal aspect of cecum without appendicitis.    Subjective & 24-hour events:  She's feeling low energy. 4x diarrhea overnight. Has abd pain that is lower center quadrant and radiates towards her back bilaterally. Sister has crohn's disease. Patient reports is dehydrated and nurses can't get an IV (had to try multiple sites)= will have vascular consult    Assessment & Plan:     #Intractable diarrhea  #Recent history of E. coli infection  -Given patient's ongoing watery diarrhea for about 20 days,   -IV fluids  -Empiric IV antibiotics with Flagyl and Rocephin  -Stool C. difficile test negative  -Waiting on well as ova and parasite and gi panel  -consider If C. difficile is negative, will give Imodium as needed    - Colonoscopy planned tomorrow     Insomnia: Trazodone at bedtime      Hospital Problems:  Principal Problem:    Intractable diarrhea  Active Problems:    Insomnia    Abdominal pain  Resolved Problems:    * No resolved hospital

## 2025-07-22 NOTE — CONSULTS
Consult Note            Date:7/22/2025        Patient Name:Amber Sanchez     YOB: 1990     Age:34 y.o.    Inpatient consult to GI  Consult performed by: Shante Nash PA-C  Consult ordered by: Irene Weber MD        Chief Complaint     Chief Complaint   Patient presents with    Abdominal Pain      History Obtained From   patient    History of Present Illness     Pt is a 33yo female with no significant PMHx presenting with chronic diarrhea. Pt states she tested positive for E.Coli 3wks ago and finished abx course but is still experiencing consistent diarrhea. Pt states she has had issues with both diarrhea and constipation for months but recently it has been only diarrhea many times throughout each day. She has tried immodium which helps but diarrhea returns as soon as she stops taking it. She has done a trial of pancreatic enzyme that did not seem to help. FamHx of Crohn's has never been tested. Never had a colonoscopy. EGD 7/15/25 found Z line irregular @[facility] and gastritis.     Today, pt endorses 2 episodes of watery stools this morning. Denies bloody stools. States she has hypogastric pain & N/V that began 3weeks ago when this started. Denies alcohol and tobacco use. Does take marijuana gummies at night. Denies blood thinners and NSAID use. No new medications. NPO.     Labs: CRP 2.5, (+) UTI    Imaging:   CT ABDOMEN PELVIS W IV CONTRAST Additional Contrast? None  Result Date: 7/21/2025  Question mild focal inflammation of the distal aspect of the cecum without appendicitis    Medications: Rocephin, IV PPI QD, maalox, dulcolax, glycolax, pepcid    Past Medical History     Past Medical History:   Diagnosis Date    Abdominal pain     AIP (acute intermittent porphyria) (HCC) dx: 2006 (age 15)    last tx with Panhematin: Dec 2013. last seen by hematology 2016. No issues since hysterectomy in 2016    Asthma     Cancer (HCC)     acute intermittent porphyria per pt blood disorder    Diarrhea     Ear

## 2025-07-22 NOTE — CONSULTS
US Guided PIV access    Called for assistance with IV access. Ultrasound was used to find the vein which was compressible and does not have any features of an artery or nerve bundle. Skin was cleaned and disinfected prior to IV puncture. Under real-time ultrasound guidance, peripheral access was obtained in the left cephalic using 22 G 1.75\" Peripheral IV catheter x 1 attempt. Blood return was present and IV flushed without difficulty. IV dressing applied, no immediate complications noted, and patient tolerated the procedure well.

## 2025-07-23 ENCOUNTER — ANESTHESIA (OUTPATIENT)
Dept: SURGERY | Age: 35
DRG: 392 | End: 2025-07-23
Payer: COMMERCIAL

## 2025-07-23 LAB
ANION GAP SERPL CALC-SCNC: 10 MMOL/L (ref 7–16)
BACTERIA SPEC CULT: NORMAL
BASOPHILS # BLD: 0.06 K/UL (ref 0–0.2)
BASOPHILS NFR BLD: 0.8 % (ref 0–2)
BUN SERPL-MCNC: 4 MG/DL (ref 6–23)
CALCIUM SERPL-MCNC: 7.9 MG/DL (ref 8.8–10.2)
CHLORIDE SERPL-SCNC: 110 MMOL/L (ref 98–107)
CO2 SERPL-SCNC: 20 MMOL/L (ref 20–29)
CREAT SERPL-MCNC: 0.82 MG/DL (ref 0.6–1.1)
DIFFERENTIAL METHOD BLD: ABNORMAL
EOSINOPHIL # BLD: 0.19 K/UL (ref 0–0.8)
EOSINOPHIL NFR BLD: 2.4 % (ref 0.5–7.8)
ERYTHROCYTE [DISTWIDTH] IN BLOOD BY AUTOMATED COUNT: 13.2 % (ref 11.9–14.6)
GLUCOSE SERPL-MCNC: 92 MG/DL (ref 70–99)
HCT VFR BLD AUTO: 34.6 % (ref 35.8–46.3)
HGB BLD-MCNC: 11.3 G/DL (ref 11.7–15.4)
IMM GRANULOCYTES # BLD AUTO: 0.01 K/UL (ref 0–0.5)
IMM GRANULOCYTES NFR BLD AUTO: 0.1 % (ref 0–5)
LYMPHOCYTES # BLD: 3.54 K/UL (ref 0.5–4.6)
LYMPHOCYTES NFR BLD: 45.3 % (ref 13–44)
MAGNESIUM SERPL-MCNC: 1.9 MG/DL (ref 1.8–2.4)
MCH RBC QN AUTO: 28.1 PG (ref 26.1–32.9)
MCHC RBC AUTO-ENTMCNC: 32.7 G/DL (ref 31.4–35)
MCV RBC AUTO: 86.1 FL (ref 82–102)
MONOCYTES # BLD: 0.53 K/UL (ref 0.1–1.3)
MONOCYTES NFR BLD: 6.8 % (ref 4–12)
NEUTS SEG # BLD: 3.49 K/UL (ref 1.7–8.2)
NEUTS SEG NFR BLD: 44.6 % (ref 43–78)
NRBC # BLD: 0 K/UL (ref 0–0.2)
PLATELET # BLD AUTO: 221 K/UL (ref 150–450)
PMV BLD AUTO: 10.7 FL (ref 9.4–12.3)
POTASSIUM SERPL-SCNC: 3.3 MMOL/L (ref 3.5–5.1)
RBC # BLD AUTO: 4.02 M/UL (ref 4.05–5.2)
SERVICE CMNT-IMP: NORMAL
SODIUM SERPL-SCNC: 140 MMOL/L (ref 136–145)
WBC # BLD AUTO: 7.8 K/UL (ref 4.3–11.1)

## 2025-07-23 PROCEDURE — 85025 COMPLETE CBC W/AUTO DIFF WBC: CPT

## 2025-07-23 PROCEDURE — 7100000000 HC PACU RECOVERY - FIRST 15 MIN: Performed by: INTERNAL MEDICINE

## 2025-07-23 PROCEDURE — 36415 COLL VENOUS BLD VENIPUNCTURE: CPT

## 2025-07-23 PROCEDURE — 2580000003 HC RX 258

## 2025-07-23 PROCEDURE — 88305 TISSUE EXAM BY PATHOLOGIST: CPT

## 2025-07-23 PROCEDURE — 2709999900 HC NON-CHARGEABLE SUPPLY: Performed by: INTERNAL MEDICINE

## 2025-07-23 PROCEDURE — 83735 ASSAY OF MAGNESIUM: CPT

## 2025-07-23 PROCEDURE — 3609010300 HC COLONOSCOPY W/BIOPSY SINGLE/MULTIPLE: Performed by: INTERNAL MEDICINE

## 2025-07-23 PROCEDURE — 3700000000 HC ANESTHESIA ATTENDED CARE: Performed by: INTERNAL MEDICINE

## 2025-07-23 PROCEDURE — 6370000000 HC RX 637 (ALT 250 FOR IP)

## 2025-07-23 PROCEDURE — 1100000000 HC RM PRIVATE

## 2025-07-23 PROCEDURE — 2500000003 HC RX 250 WO HCPCS: Performed by: NURSE PRACTITIONER

## 2025-07-23 PROCEDURE — 2500000003 HC RX 250 WO HCPCS: Performed by: INTERNAL MEDICINE

## 2025-07-23 PROCEDURE — 6370000000 HC RX 637 (ALT 250 FOR IP): Performed by: INTERNAL MEDICINE

## 2025-07-23 PROCEDURE — 80048 BASIC METABOLIC PNL TOTAL CA: CPT

## 2025-07-23 PROCEDURE — 7100000001 HC PACU RECOVERY - ADDTL 15 MIN: Performed by: INTERNAL MEDICINE

## 2025-07-23 PROCEDURE — 6360000002 HC RX W HCPCS: Performed by: ANESTHESIOLOGY

## 2025-07-23 PROCEDURE — 2580000003 HC RX 258: Performed by: ANESTHESIOLOGY

## 2025-07-23 PROCEDURE — 6360000002 HC RX W HCPCS: Performed by: INTERNAL MEDICINE

## 2025-07-23 PROCEDURE — 0DBN8ZX EXCISION OF SIGMOID COLON, VIA NATURAL OR ARTIFICIAL OPENING ENDOSCOPIC, DIAGNOSTIC: ICD-10-PCS | Performed by: INTERNAL MEDICINE

## 2025-07-23 PROCEDURE — 2580000003 HC RX 258: Performed by: INTERNAL MEDICINE

## 2025-07-23 PROCEDURE — 76937 US GUIDE VASCULAR ACCESS: CPT

## 2025-07-23 PROCEDURE — 45380 COLONOSCOPY AND BIOPSY: CPT | Performed by: INTERNAL MEDICINE

## 2025-07-23 PROCEDURE — 3700000001 HC ADD 15 MINUTES (ANESTHESIA): Performed by: INTERNAL MEDICINE

## 2025-07-23 PROCEDURE — 6360000002 HC RX W HCPCS

## 2025-07-23 PROCEDURE — 6360000002 HC RX W HCPCS: Performed by: NURSE PRACTITIONER

## 2025-07-23 RX ORDER — ONDANSETRON 2 MG/ML
4 INJECTION INTRAMUSCULAR; INTRAVENOUS
Status: DISCONTINUED | OUTPATIENT
Start: 2025-07-23 | End: 2025-07-23 | Stop reason: HOSPADM

## 2025-07-23 RX ORDER — SODIUM CHLORIDE, SODIUM LACTATE, POTASSIUM CHLORIDE, CALCIUM CHLORIDE 600; 310; 30; 20 MG/100ML; MG/100ML; MG/100ML; MG/100ML
INJECTION, SOLUTION INTRAVENOUS CONTINUOUS
Status: DISCONTINUED | OUTPATIENT
Start: 2025-07-23 | End: 2025-07-23 | Stop reason: HOSPADM

## 2025-07-23 RX ORDER — LIDOCAINE HYDROCHLORIDE 10 MG/ML
1 INJECTION, SOLUTION INFILTRATION; PERINEURAL
Status: DISCONTINUED | OUTPATIENT
Start: 2025-07-23 | End: 2025-07-23 | Stop reason: HOSPADM

## 2025-07-23 RX ORDER — PHENYLEPHRINE HYDROCHLORIDE 10 MG/ML
INJECTION INTRAVENOUS
Status: DISCONTINUED | OUTPATIENT
Start: 2025-07-23 | End: 2025-07-23 | Stop reason: SDUPTHER

## 2025-07-23 RX ORDER — PROPOFOL 10 MG/ML
INJECTION, EMULSION INTRAVENOUS
Status: DISCONTINUED | OUTPATIENT
Start: 2025-07-23 | End: 2025-07-23 | Stop reason: SDUPTHER

## 2025-07-23 RX ORDER — MORPHINE SULFATE 2 MG/ML
2 INJECTION, SOLUTION INTRAMUSCULAR; INTRAVENOUS ONCE
Refills: 0 | Status: COMPLETED | OUTPATIENT
Start: 2025-07-23 | End: 2025-07-23

## 2025-07-23 RX ORDER — CETIRIZINE HYDROCHLORIDE 10 MG/1
10 TABLET ORAL DAILY
Status: DISCONTINUED | OUTPATIENT
Start: 2025-07-23 | End: 2025-07-24 | Stop reason: HOSPADM

## 2025-07-23 RX ORDER — DIPHENHYDRAMINE HCL 25 MG
25 CAPSULE ORAL NIGHTLY PRN
Status: DISCONTINUED | OUTPATIENT
Start: 2025-07-23 | End: 2025-07-24 | Stop reason: HOSPADM

## 2025-07-23 RX ORDER — SODIUM CHLORIDE, SODIUM LACTATE, POTASSIUM CHLORIDE, CALCIUM CHLORIDE 600; 310; 30; 20 MG/100ML; MG/100ML; MG/100ML; MG/100ML
INJECTION, SOLUTION INTRAVENOUS
Status: DISCONTINUED | OUTPATIENT
Start: 2025-07-23 | End: 2025-07-23 | Stop reason: SDUPTHER

## 2025-07-23 RX ORDER — SODIUM CHLORIDE 9 MG/ML
INJECTION, SOLUTION INTRAVENOUS PRN
Status: DISCONTINUED | OUTPATIENT
Start: 2025-07-23 | End: 2025-07-23 | Stop reason: HOSPADM

## 2025-07-23 RX ORDER — SODIUM CHLORIDE 0.9 % (FLUSH) 0.9 %
5-40 SYRINGE (ML) INJECTION EVERY 12 HOURS SCHEDULED
Status: DISCONTINUED | OUTPATIENT
Start: 2025-07-23 | End: 2025-07-23 | Stop reason: HOSPADM

## 2025-07-23 RX ORDER — LIDOCAINE HYDROCHLORIDE 20 MG/ML
INJECTION, SOLUTION EPIDURAL; INFILTRATION; INTRACAUDAL; PERINEURAL
Status: DISCONTINUED | OUTPATIENT
Start: 2025-07-23 | End: 2025-07-23 | Stop reason: SDUPTHER

## 2025-07-23 RX ORDER — SODIUM CHLORIDE 0.9 % (FLUSH) 0.9 %
5-40 SYRINGE (ML) INJECTION PRN
Status: DISCONTINUED | OUTPATIENT
Start: 2025-07-23 | End: 2025-07-23 | Stop reason: HOSPADM

## 2025-07-23 RX ORDER — ONDANSETRON 2 MG/ML
4 INJECTION INTRAMUSCULAR; INTRAVENOUS ONCE
Status: COMPLETED | OUTPATIENT
Start: 2025-07-23 | End: 2025-07-23

## 2025-07-23 RX ADMIN — PANTOPRAZOLE SODIUM 40 MG: 40 INJECTION, POWDER, FOR SOLUTION INTRAVENOUS at 07:58

## 2025-07-23 RX ADMIN — SODIUM CHLORIDE, POTASSIUM CHLORIDE, SODIUM LACTATE AND CALCIUM CHLORIDE: 600; 310; 30; 20 INJECTION, SOLUTION INTRAVENOUS at 10:42

## 2025-07-23 RX ADMIN — ESTRADIOL 1 MG: 1 TABLET ORAL at 07:58

## 2025-07-23 RX ADMIN — ONDANSETRON 4 MG: 4 TABLET, ORALLY DISINTEGRATING ORAL at 15:01

## 2025-07-23 RX ADMIN — WATER 1000 MG: 1 INJECTION INTRAMUSCULAR; INTRAVENOUS; SUBCUTANEOUS at 17:10

## 2025-07-23 RX ADMIN — SODIUM CHLORIDE, PRESERVATIVE FREE 10 ML: 5 INJECTION INTRAVENOUS at 07:59

## 2025-07-23 RX ADMIN — SODIUM CHLORIDE: 0.9 INJECTION, SOLUTION INTRAVENOUS at 04:46

## 2025-07-23 RX ADMIN — LIDOCAINE HYDROCHLORIDE 50 MG: 20 INJECTION, SOLUTION EPIDURAL; INFILTRATION; INTRACAUDAL; PERINEURAL at 11:00

## 2025-07-23 RX ADMIN — PROPOFOL 50 MG: 10 INJECTION, EMULSION INTRAVENOUS at 11:03

## 2025-07-23 RX ADMIN — PROPOFOL 80 MG: 10 INJECTION, EMULSION INTRAVENOUS at 11:00

## 2025-07-23 RX ADMIN — SODIUM CHLORIDE: 0.9 INJECTION, SOLUTION INTRAVENOUS at 20:25

## 2025-07-23 RX ADMIN — POTASSIUM CHLORIDE 40 MEQ: 1500 TABLET, EXTENDED RELEASE ORAL at 06:12

## 2025-07-23 RX ADMIN — PROPOFOL 50 MG: 10 INJECTION, EMULSION INTRAVENOUS at 11:11

## 2025-07-23 RX ADMIN — PROPOFOL 50 MG: 10 INJECTION, EMULSION INTRAVENOUS at 11:17

## 2025-07-23 RX ADMIN — CETIRIZINE HYDROCHLORIDE 10 MG: 10 TABLET, FILM COATED ORAL at 12:29

## 2025-07-23 RX ADMIN — TRAZODONE HYDROCHLORIDE 100 MG: 50 TABLET ORAL at 20:16

## 2025-07-23 RX ADMIN — PHENYLEPHRINE HYDROCHLORIDE 100 MCG: 10 INJECTION INTRAVENOUS at 11:06

## 2025-07-23 RX ADMIN — PROPOFOL 120 MCG/KG/MIN: 10 INJECTION, EMULSION INTRAVENOUS at 11:01

## 2025-07-23 RX ADMIN — SODIUM CHLORIDE, SODIUM LACTATE, POTASSIUM CHLORIDE, AND CALCIUM CHLORIDE: 600; 310; 30; 20 INJECTION, SOLUTION INTRAVENOUS at 10:43

## 2025-07-23 RX ADMIN — ONDANSETRON 4 MG: 2 INJECTION INTRAMUSCULAR; INTRAVENOUS at 10:44

## 2025-07-23 RX ADMIN — ONDANSETRON 4 MG: 2 INJECTION INTRAMUSCULAR; INTRAVENOUS at 20:21

## 2025-07-23 RX ADMIN — PHENYLEPHRINE HYDROCHLORIDE 100 MCG: 10 INJECTION INTRAVENOUS at 11:21

## 2025-07-23 RX ADMIN — PROCHLORPERAZINE EDISYLATE 10 MG: 5 INJECTION INTRAMUSCULAR; INTRAVENOUS at 17:06

## 2025-07-23 RX ADMIN — MORPHINE SULFATE 2 MG: 2 INJECTION, SOLUTION INTRAMUSCULAR; INTRAVENOUS at 20:16

## 2025-07-23 ASSESSMENT — PAIN SCALES - GENERAL
PAINLEVEL_OUTOF10: 0
PAINLEVEL_OUTOF10: 8
PAINLEVEL_OUTOF10: 0

## 2025-07-23 ASSESSMENT — PAIN DESCRIPTION - ORIENTATION: ORIENTATION: ANTERIOR

## 2025-07-23 ASSESSMENT — PAIN DESCRIPTION - DESCRIPTORS: DESCRIPTORS: CRAMPING;DISCOMFORT

## 2025-07-23 ASSESSMENT — PAIN DESCRIPTION - PAIN TYPE: TYPE: ACUTE PAIN

## 2025-07-23 ASSESSMENT — PAIN DESCRIPTION - FREQUENCY: FREQUENCY: CONTINUOUS

## 2025-07-23 ASSESSMENT — PAIN - FUNCTIONAL ASSESSMENT
PAIN_FUNCTIONAL_ASSESSMENT: PREVENTS OR INTERFERES SOME ACTIVE ACTIVITIES AND ADLS
PAIN_FUNCTIONAL_ASSESSMENT: 0-10

## 2025-07-23 ASSESSMENT — PAIN DESCRIPTION - ONSET: ONSET: ON-GOING

## 2025-07-23 ASSESSMENT — LIFESTYLE VARIABLES: SMOKING_STATUS: 0

## 2025-07-23 ASSESSMENT — PAIN DESCRIPTION - LOCATION: LOCATION: ABDOMEN

## 2025-07-23 NOTE — CONSULTS
Ultrasound was used to find the vein which was compressible and without any ultrasound features of an artery or nerve bundle. Skin was cleaned and disinfected prior to IV puncture.  Under real-time ultrasound guidance peripheral access was obtained in the right forearm using 22 G 1.75\" Peripheral IV catheter after 1 attempt(s). No immediate complications noted. Patient tolerated the procedure well.  Refer to IV flowsheet for further documentation.

## 2025-07-23 NOTE — PERIOP NOTE
TRANSFER - OUT REPORT:    Verbal report given to RN on Amber Sanchez  being transferred to [facility] for routine post-op       Report consisted of patient’s Situation, Background, Assessment and   Recommendations(SBAR).     Information from the following report(s) Nurse Handoff Report, Adult Overview, Surgery Report, and Event Log was reviewed with the receiving nurse.    Lines:   Peripheral IV 07/22/25 Left Cephalic (Active)   Site Assessment Clean, dry & intact 07/23/25 1125   Line Status Infusing 07/23/25 1125   Line Care Connections checked and tightened 07/23/25 0800   Phlebitis Assessment No symptoms 07/23/25 1125   Infiltration Assessment 0 07/23/25 1125   Alcohol Cap Used No 07/23/25 1125   Dressing Status Clean, dry & intact 07/23/25 1125   Dressing Type Transparent 07/23/25 1125   Dressing Intervention New 07/22/25 1015        Opportunity for questions and clarification was provided.          VTE prophylaxis orders have been written for Amber Sanchez.    Patient and family given floor number and nurses name.  Family updated re: pt status after security code verified.

## 2025-07-23 NOTE — ANESTHESIA PRE PROCEDURE
Department of Anesthesiology  Preprocedure Note       Name:  Amber Sanchez   Age:  34 y.o.  :  1990                                          MRN:  [id number]         Date:  2025      Surgeon: Surgeon(s):  Vitor Morales MD    Procedure: Procedure(s):  COLONOSCOPY DIAGNOSTIC    Medications prior to admission:   Prior to Admission medications    Medication Sig Start Date End Date Taking? Authorizing Provider   Pancrelipase, Lip-Prot-Amyl, 09527-996738 units CPEP Take 60,000 Units by mouth 3 times daily (before meals) Take 1 capsule by mouth with the first bite of each meal and 1 capsule by mouth with each snack. 25  Yes Tasha Jackson APRN - CNP   omeprazole (PRILOSEC) 40 MG delayed release capsule Take 1 capsule by mouth 2 times daily (before meals) for 60 days, THEN 1 capsule every morning (before breakfast). 7/15/25 11/12/25 Yes Greyson Tillman MD   ondansetron (ZOFRAN-ODT) 4 MG disintegrating tablet Take 1 tablet by mouth 3 times daily as needed for Nausea or Vomiting 25  Yes Verito Brown APRN - CNP   traZODone (DESYREL) 100 MG tablet Take 1 tablet by mouth nightly 3/13/25  Yes Farhan Quijano DO   estradiol (ESTRACE) 1 MG tablet Take 1 tablet by mouth daily 10/15/24  Yes Manasa Mckeon MD   albuterol sulfate HFA (VENTOLIN HFA) 108 (90 Base) MCG/ACT inhaler Inhale 2 puffs into the lungs 4 times daily as needed for Wheezing 3/19/24  Yes Verito Brown APRN - CNP   fluticasone-salmeterol (ADVAIR DISKUS) 500-50 MCG/ACT AEPB diskus inhaler Inhale 1 puff into the lungs in the morning and 1 puff in the evening.  Patient taking differently: Inhale 1 puff into the lungs as needed (for allergy flare ups) 24   Farhan Quijano DO   cetirizine (ZYRTEC) 5 MG tablet Take 1 tablet by mouth daily  Patient not taking: Reported on 2025    Provider, MD Kun   diphenhydrAMINE (BENADRYL) 25 MG tablet Take 1 tablet by mouth nightly as needed for Allergies or

## 2025-07-23 NOTE — PROGRESS NOTES
TRANSFER - IN REPORT:  Verbal report received from Rosemarie HANKS on Amber Sanchez  being received from PACU for routine progression of patient care      Report consisted of patient's Situation, Background, Assessment and   Recommendations(SBAR).     Information from the following report(s) Nurse Handoff Report was reviewed with the receiving nurse.    Opportunity for questions and clarification was provided.      Assessment will be completed upon patient's arrival to unit and care assumed.

## 2025-07-23 NOTE — PROGRESS NOTES
Resident Progress Note   Admit Date:  2025 11:46 AM   Name:  Amber Sanchez   Age:  34 y.o.  Sex:  female  :  1990   MRN:  [id number]   Room:  [facility]/    Presenting Complaint: Abdominal Pain    Reason(s) for Admission: Colitis [K52.9]  Lower abdominal pain [R10.30]  Intractable diarrhea [R19.7]  Diarrhea, unspecified type [R19.7]     Hospital Course:   Amber Sanchez is a 34 y.o. female with no significant past medical history who presents emergency room with ongoing watery diarrhea for past 20 days.  Patient reports abdominal pain associated with diarrhea.  Abdominal pain subsides 15 to 20 minutes after having a bowel movement.  Patient has been having multiple watery bowel movement daily.  Patient was seen by GI and recently tested positive for E. coli in her stool and finished treatment but no improvement.  Patient was started on trial of pancreatic enzyme, Pepcid/omeprazole without much relief. In the emergency room, CT abdomen pelvis with mild focal inflammation of the distal aspect of cecum without appendicitis. Colonoscopy performed 2025 with random biopsies.         Subjective & 24-hour events:    Patient is feeling \"run down\" because of of bowel prep but otherwise no new concerns. Denies sob, chest pain. Ordered allergy meds for her symptoms of congestion/allergies.   Assessment & Plan:     #Intractable diarrhea  #Recent history of E. coli infection  -Given patient's ongoing watery diarrhea for about 21 days  -IV fluids  -Empiric IV antibiotics with Flagyl and Rocephin  -Stool C. difficile test negative  -ova and parasite and gi panel negative     - Colonoscopy and random biopsies taken today 2025. Normal colon on scope.  Per GI-  -Await final pathology   -recommend imodium in interim   -add bentyl 20mg PO TID    #Pancreatic elastase deficiency  -patient on outpatient pancrelipase outpatient per chart review    #hypokalemia  -low K due to chronic diarrhea and was on

## 2025-07-23 NOTE — ANESTHESIA POSTPROCEDURE EVALUATION
Department of Anesthesiology  Postprocedure Note    Patient: Amber Sanchez  MRN: [id number]  YOB: 1990  Date of evaluation: 7/23/2025    Procedure Summary       Date: 07/23/25 Room / Location: [facility] OP OR 01 / [facility]    Anesthesia Start: 1056 Anesthesia Stop: 1129    Procedure: COLONOSCOPY BIOPSY Diagnosis:       Chronic diarrhea      (Chronic diarrhea [K52.9])    Surgeons: Vitor Morales MD Responsible Provider: Tomy Gurrola MD    Anesthesia Type: TIVA ASA Status: 3            Anesthesia Type: No value filed.    Laurel Phase I: Laurel Score: 10    Laurel Phase II:      Anesthesia Post Evaluation    Patient location during evaluation: PACU  Patient participation: complete - patient participated  Level of consciousness: awake and alert  Airway patency: patent  Nausea & Vomiting: no nausea and no vomiting  Cardiovascular status: hemodynamically stable  Respiratory status: acceptable, nonlabored ventilation and spontaneous ventilation  Hydration status: euvolemic  Comments: /77   Pulse 58   Temp 97.9 °F (36.6 °C) (Oral)   Resp 16   Ht 1.6 m (5' 3\")   Wt 89.1 kg (196 lb 8 oz)   SpO2 97%   BMI 34.81 kg/m²     Multimodal analgesia pain management approach  Pain management: adequate and satisfactory to patient        No notable events documented.

## 2025-07-23 NOTE — OP NOTE
Procedure: Colonoscopy with biopsy    Indication: Chronic diarrhea    Date of Procedure: 7/23/2025    Patient profile: Refer to patient note in chart for documentation of history and physical    Providers: Vitor Morales MD    Referring MD: No ref. provider found    PCP: Farhan Quijano DO    Medicines: Monitored anesthesia care    Complication: No immediate complications.     Estimated blood loss: Minimal    Procedure: After the risks including, but not limited to medication reaction, infection, bleeding, perforation, missed lesions, benefits and alternatives of the procedure were discussed with the patient and all questions were answered, informed consent was obtained. The pediatric colonoscope was passed under direct vision throughout the procedure, the patient's blood pressure pulse and oxygen saturation were monitored continuously. The scope was introduced through the anal canal and advanced to the cecum. The endoscopy was performed without difficulty.The patient tolerated the procedure well.     Findings:     The pediatric colonoscope was introduced from the anal canal and advanced to the cecum. The quality of the bowel preparation was evaluated using the BBPS ([place] Bowel Preparation Scale) with scores of right colon = 3, transverse colon =3, and left colon = 3. The total BBPS score was 9. The rest of the entire examined colon was normal.     Random biopsies obtained from right and left colon obtained to rule out microscopic colitis    The scope was pulled back, and the procedure was subsequently ended.    Impression:  --normal colon. Random biopsies obtained  --    Recommendations:  --Await final pathology  --recommend imodium in interim  --add bentyl 20mg PO TID      Vitor Morales MD  Gastroenterology and Interventional Endoscopy

## 2025-07-24 VITALS
HEIGHT: 63 IN | RESPIRATION RATE: 17 BRPM | BODY MASS INDEX: 34.82 KG/M2 | TEMPERATURE: 98.1 F | WEIGHT: 196.5 LBS | HEART RATE: 80 BPM | SYSTOLIC BLOOD PRESSURE: 117 MMHG | DIASTOLIC BLOOD PRESSURE: 70 MMHG | OXYGEN SATURATION: 97 %

## 2025-07-24 LAB
ANION GAP SERPL CALC-SCNC: 9 MMOL/L (ref 7–16)
BASOPHILS # BLD: 0.06 K/UL (ref 0–0.2)
BASOPHILS NFR BLD: 0.8 % (ref 0–2)
BUN SERPL-MCNC: 3 MG/DL (ref 6–23)
CALCIUM SERPL-MCNC: 8.3 MG/DL (ref 8.8–10.2)
CALPROTECTIN STL-MCNT: <5 UG/G (ref 0–120)
CHLORIDE SERPL-SCNC: 108 MMOL/L (ref 98–107)
CO2 SERPL-SCNC: 23 MMOL/L (ref 20–29)
CREAT SERPL-MCNC: 0.87 MG/DL (ref 0.6–1.1)
DIFFERENTIAL METHOD BLD: ABNORMAL
EOSINOPHIL # BLD: 0.13 K/UL (ref 0–0.8)
EOSINOPHIL NFR BLD: 1.8 % (ref 0.5–7.8)
ERYTHROCYTE [DISTWIDTH] IN BLOOD BY AUTOMATED COUNT: 13.4 % (ref 11.9–14.6)
GLUCOSE SERPL-MCNC: 99 MG/DL (ref 70–99)
HCT VFR BLD AUTO: 34.5 % (ref 35.8–46.3)
HGB BLD-MCNC: 11.4 G/DL (ref 11.7–15.4)
IMM GRANULOCYTES # BLD AUTO: 0.03 K/UL (ref 0–0.5)
IMM GRANULOCYTES NFR BLD AUTO: 0.4 % (ref 0–5)
LYMPHOCYTES # BLD: 3.36 K/UL (ref 0.5–4.6)
LYMPHOCYTES NFR BLD: 47.4 % (ref 13–44)
MCH RBC QN AUTO: 28.2 PG (ref 26.1–32.9)
MCHC RBC AUTO-ENTMCNC: 33 G/DL (ref 31.4–35)
MCV RBC AUTO: 85.4 FL (ref 82–102)
MONOCYTES # BLD: 0.49 K/UL (ref 0.1–1.3)
MONOCYTES NFR BLD: 6.9 % (ref 4–12)
NEUTS SEG # BLD: 3.02 K/UL (ref 1.7–8.2)
NEUTS SEG NFR BLD: 42.7 % (ref 43–78)
NRBC # BLD: 0 K/UL (ref 0–0.2)
PLATELET # BLD AUTO: 235 K/UL (ref 150–450)
PMV BLD AUTO: 10.8 FL (ref 9.4–12.3)
POTASSIUM SERPL-SCNC: 3.4 MMOL/L (ref 3.5–5.1)
POTASSIUM SERPL-SCNC: ABNORMAL MMOL/L (ref 3.5–5.1)
RBC # BLD AUTO: 4.04 M/UL (ref 4.05–5.2)
SODIUM SERPL-SCNC: 140 MMOL/L (ref 136–145)
WBC # BLD AUTO: 7.1 K/UL (ref 4.3–11.1)

## 2025-07-24 PROCEDURE — 80048 BASIC METABOLIC PNL TOTAL CA: CPT

## 2025-07-24 PROCEDURE — 2500000003 HC RX 250 WO HCPCS: Performed by: INTERNAL MEDICINE

## 2025-07-24 PROCEDURE — 6360000002 HC RX W HCPCS: Performed by: INTERNAL MEDICINE

## 2025-07-24 PROCEDURE — 6370000000 HC RX 637 (ALT 250 FOR IP): Performed by: INTERNAL MEDICINE

## 2025-07-24 PROCEDURE — 6370000000 HC RX 637 (ALT 250 FOR IP)

## 2025-07-24 PROCEDURE — 84132 ASSAY OF SERUM POTASSIUM: CPT

## 2025-07-24 PROCEDURE — 85025 COMPLETE CBC W/AUTO DIFF WBC: CPT

## 2025-07-24 PROCEDURE — 36415 COLL VENOUS BLD VENIPUNCTURE: CPT

## 2025-07-24 RX ORDER — FAMOTIDINE 10 MG
10 TABLET ORAL DAILY PRN
Qty: 60 TABLET | Refills: 0 | Status: SHIPPED | OUTPATIENT
Start: 2025-07-24

## 2025-07-24 RX ORDER — LOPERAMIDE HYDROCHLORIDE 2 MG/1
2 CAPSULE ORAL 4 TIMES DAILY PRN
Qty: 40 CAPSULE | Refills: 0 | Status: SHIPPED | OUTPATIENT
Start: 2025-07-24 | End: 2025-08-03

## 2025-07-24 RX ORDER — DICYCLOMINE HYDROCHLORIDE 10 MG/1
10 CAPSULE ORAL 3 TIMES DAILY PRN
Qty: 90 CAPSULE | Refills: 0 | Status: SHIPPED | OUTPATIENT
Start: 2025-07-24 | End: 2025-08-23

## 2025-07-24 RX ADMIN — SODIUM CHLORIDE, PRESERVATIVE FREE 5 ML: 5 INJECTION INTRAVENOUS at 08:17

## 2025-07-24 RX ADMIN — POTASSIUM CHLORIDE 40 MEQ: 1500 TABLET, EXTENDED RELEASE ORAL at 13:55

## 2025-07-24 RX ADMIN — CETIRIZINE HYDROCHLORIDE 10 MG: 10 TABLET, FILM COATED ORAL at 08:17

## 2025-07-24 RX ADMIN — ONDANSETRON 4 MG: 2 INJECTION INTRAMUSCULAR; INTRAVENOUS at 08:17

## 2025-07-24 RX ADMIN — PANTOPRAZOLE SODIUM 40 MG: 40 INJECTION, POWDER, FOR SOLUTION INTRAVENOUS at 08:17

## 2025-07-24 RX ADMIN — ESTRADIOL 1 MG: 1 TABLET ORAL at 08:17

## 2025-07-24 ASSESSMENT — PAIN SCALES - GENERAL: PAINLEVEL_OUTOF10: 0

## 2025-07-24 NOTE — DISCHARGE INSTRUCTIONS
Try to increase food intake over the next few days as tolerated. From broth to jello to mashed foods to solids

## 2025-07-24 NOTE — DISCHARGE INSTR - DIET

## 2025-07-24 NOTE — CARE COORDINATION
Patient with DC orders today. Patients family in the room to take her home, denies any home needs. States no issues with follow ups or picking up medications at discharge. No additional needs made known to RN STEVE. Patient and family with no questions or concerns. Patient has met all treatment goals and milestones for discharge. Patient to contact CM if any new needs arise.         07/24/25 2088   Services At/After Discharge   Transition of Care Consult (CM Consult) Discharge Planning   Services At/After Discharge None    Resource Information Provided? Yes   Mode of Transport at Discharge Self   Confirm Follow Up Transport Family   Condition of Participation: Discharge Planning   The Plan for Transition of Care is related to the following treatment goals: return to baseline   The Patient and/or Patient Representative was provided with a Choice of Provider? Patient   The Patient and/Or Patient Representative agree with the Discharge Plan? Yes   Freedom of Choice list was provided with basic dialogue that supports the patient's individualized plan of care/goals, treatment preferences, and shares the quality data associated with the providers?  Yes

## 2025-07-24 NOTE — PROGRESS NOTES
Nutrition Assessment  Assessment Type: Reassess  Reason for visit:  Best Practice Alert: Malnutrition Screening Tool   Malnutrition Screening Tool Score: 2  Unintentional weight loss PTA: 2 to 13 pounds (1 point)  Eating poorly due to decreased appetite: Yes (1 point)    Nutrition Intervention:   Food and/or Nutrient Delivery:   Meals and Snacks:  Diet: Continue current order  Medical Food Supplements:   Medical food supplement therapy:  None Plan to d/c today but received an Ensure clear from floor stock from RD.     Malnutrition Assessment:  [employer]y/A[employer]S.P[employer]N Clinical Malnutrition Criteria  Malnutrition Status: At risk for malnutrition  Context: Acute Illness  Findings of clinical characteristics of malnutrition:   Energy Intake:  50% or less of estimated energy requirements for 5 or more days  Weight Loss:  Mild weight loss (3.7% in the past 2 weeks)     Body Fat Loss:  No body fat loss Triceps, Buccal region   Muscle Mass Loss:  Mild muscle mass loss Temples (temporalis), Clavicles (pectoralis & deltoids), Hand (interosseous), Calf (gastrocnemius)    Nutrition Assessment:  Food/Nutrition Related History: Pt reports decreased intake for the past 20 days. She stated she is unable to keep anything in. She endorses nausea, vomiting, and diarrhea. She stated she is eating soft bland foods such as mashed potatoes because she knows she needs to eat something. Pt denies ONS use.      Do You Have Any Cultural, Jew, or Ethnic Food Preferences?: No   Weight History: Pt endorses wt loss of ~8lbs in the past 20 days. Per EMR wt hx review of internal medicine office visits 11/25 200lb, 1/3 196lb, 7/7 204lb. Pt has lost ~3.7% body wt in the past 2 weeks using ov wt 7/7 and admission bed wt. This is notable however not clinically significant.   Nutrition Background:       No significant PMH. Pt admitted with intractable diarrhea and recent E.coli infection.   Recent OP EGD 7/15 showed z-line irregular at the

## 2025-07-24 NOTE — DISCHARGE SUMMARY
Medical Teaching Service: Attending Attestation    I, Tammy Brewer DO, have performed a history and physical examination of the patient and discussed her management with the resident.  I have reviewed the resident's note and agreed with the documented findings and plan of care with addition of the following changes.        Physical Exam:   General:    Well nourished.  In no acute distress.  Head:  Normocephalic, atraumatic  Eyes:  Sclerae appear normal.  Pupils equally round.  ENT:  Nares appear normal.  Moist oral mucosa  Neck:  No restricted ROM.  Trachea midline   CV:   RRR.  No m/r/g.  No jugular venous distension.  Lungs:   CTAB.  No wheezing, rhonchi, or rales.  Symmetric expansion.  Abdomen:   Soft, nontender, nondistended.  Extremities: No cyanosis or clubbing.  No edema  Skin:     No rashes.  Normal coloration.   Warm and dry.    Neuro:  CN II-XII grossly intact.    Psych:  Normal mood and affect.     I have personally reviewed labs, tests, EKGs: Previous notes, lab work, vital signs, consult notes, medications        Additional Comments / Assessment & Plan:  Mrs. Sanchez will be discharged today to follow-up with her PCP. She will also follow-up with GI and Hematology as below. New medications are prescribed as below and sent to her pharmacy of choice on file. All questions and concerns addressed before discharge. She understands and agrees with her plan of care.    Signed:  Tammy Brewer DO        Internal Medicine Resident Discharge Summary   Admit Date:  2025 11:46 AM   DC Note date: 2025  Name:  Amber Sanchez   Age:  34 y.o.  Sex:  female  :  1990   MRN:  [id number]   Room:  [address] [place] [address]  PCP:  Farhan Quijano DO    Presenting Complaint: Abdominal Pain     Initial Admission Diagnosis: Colitis [K52.9]  Lower abdominal pain [R10.30]  Intractable diarrhea [R19.7]  Diarrhea, unspecified type [R19.7]     Problem List for this Hospitalization (present on     CO2 23 20 - 29 mmol/L    Anion Gap 9 7 - 16 mmol/L    Glucose 99 70 - 99 mg/dL    BUN 3 (L) 6 - 23 MG/DL    Creatinine 0.87 0.60 - 1.10 MG/DL    Est, Glom Filt Rate >90 >60 ml/min/1.73m2    Calcium 8.3 (L) 8.8 - 10.2 MG/DL   CBC with Auto Differential    Collection Time: 07/24/25  3:29 AM   Result Value Ref Range    WBC 7.1 4.3 - 11.1 K/uL    RBC 4.04 (L) 4.05 - 5.2 M/uL    Hemoglobin 11.4 (L) 11.7 - 15.4 g/dL    Hematocrit 34.5 (L) 35.8 - 46.3 %    MCV 85.4 82 - 102 FL    MCH 28.2 26.1 - 32.9 PG    MCHC 33.0 31.4 - 35.0 g/dL    RDW 13.4 11.9 - 14.6 %    Platelets 235 150 - 450 K/uL    MPV 10.8 9.4 - 12.3 FL    nRBC 0.00 0.0 - 0.2 K/uL    Differential Type AUTOMATED      Neutrophils % 42.7 (L) 43.0 - 78.0 %    Lymphocytes % 47.4 (H) 13.0 - 44.0 %    Monocytes % 6.9 4.0 - 12.0 %    Eosinophils % 1.8 0.5 - 7.8 %    Basophils % 0.8 0.0 - 2.0 %    Immature Granulocytes % 0.4 0.0 - 5.0 %    Neutrophils Absolute 3.02 1.70 - 8.20 K/UL    Lymphocytes Absolute 3.36 0.50 - 4.60 K/UL    Monocytes Absolute 0.49 0.10 - 1.30 K/UL    Eosinophils Absolute 0.13 0.00 - 0.80 K/UL    Basophils Absolute 0.06 0.00 - 0.20 K/UL    Immature Granulocytes Absolute 0.03 0.0 - 0.5 K/UL       No results for input(s): \"COVID19\" in the last 72 hours.    Recent Vital Data:  Patient Vitals for the past 24 hrs:   Temp Pulse Resp BP SpO2   07/24/25 0749 98.1 °F (36.7 °C) 80 17 117/70 97 %   07/24/25 0515 98.1 °F (36.7 °C) 74 17 109/72 96 %   07/23/25 1920 97.7 °F (36.5 °C) 79 17 125/81 98 %   07/23/25 1529 98.1 °F (36.7 °C) 87 20 (!) 102/52 98 %   07/23/25 1228 97.9 °F (36.6 °C) 58 16 110/77 97 %   07/23/25 1155 -- 65 -- 126/64 96 %   07/23/25 1150 -- 57 -- 108/65 97 %   07/23/25 1145 -- 66 16 115/67 98 %   07/23/25 1140 -- 87 -- 104/74 98 %   07/23/25 1135 -- 80 -- (!) 97/53 96 %   07/23/25 1130 -- 72 -- -- 98 %   07/23/25 1128 97.4 °F (36.3 °C) 71 14 (!) 112/55 94 %       Oxygen Therapy  SpO2: 97 %  Pulse Oximeter Device Mode: Continuous  Pulse

## 2025-07-28 ENCOUNTER — OFFICE VISIT (OUTPATIENT)
Dept: INTERNAL MEDICINE CLINIC | Facility: CLINIC | Age: 35
End: 2025-07-28
Payer: COMMERCIAL

## 2025-07-28 ENCOUNTER — TELEPHONE (OUTPATIENT)
Dept: INTERNAL MEDICINE CLINIC | Facility: CLINIC | Age: 35
End: 2025-07-28

## 2025-07-28 VITALS
HEIGHT: 63 IN | WEIGHT: 196 LBS | SYSTOLIC BLOOD PRESSURE: 130 MMHG | HEART RATE: 105 BPM | TEMPERATURE: 98.5 F | BODY MASS INDEX: 34.73 KG/M2 | OXYGEN SATURATION: 96 % | DIASTOLIC BLOOD PRESSURE: 80 MMHG

## 2025-07-28 DIAGNOSIS — R11.2 NAUSEA VOMITING AND DIARRHEA: Primary | ICD-10-CM

## 2025-07-28 DIAGNOSIS — E87.6 HYPOKALEMIA: ICD-10-CM

## 2025-07-28 DIAGNOSIS — R19.7 NAUSEA VOMITING AND DIARRHEA: Primary | ICD-10-CM

## 2025-07-28 DIAGNOSIS — D64.9 ANEMIA, UNSPECIFIED TYPE: ICD-10-CM

## 2025-07-28 DIAGNOSIS — R19.7 DIARRHEA, UNSPECIFIED TYPE: ICD-10-CM

## 2025-07-28 DIAGNOSIS — R10.30 LOWER ABDOMINAL PAIN: ICD-10-CM

## 2025-07-28 LAB
ALBUMIN SERPL-MCNC: 3.5 G/DL (ref 3.5–5)
ALBUMIN/GLOB SERPL: 1.2 (ref 1–1.9)
ALP SERPL-CCNC: 80 U/L (ref 35–104)
ALT SERPL-CCNC: 44 U/L (ref 8–45)
ANION GAP SERPL CALC-SCNC: 13 MMOL/L (ref 7–16)
AST SERPL-CCNC: 25 U/L (ref 15–37)
BILIRUB SERPL-MCNC: 0.3 MG/DL (ref 0–1.2)
BUN SERPL-MCNC: 4 MG/DL (ref 6–23)
CALCIUM SERPL-MCNC: 9.6 MG/DL (ref 8.8–10.2)
CHLORIDE SERPL-SCNC: 104 MMOL/L (ref 98–107)
CO2 SERPL-SCNC: 21 MMOL/L (ref 20–29)
CREAT SERPL-MCNC: 0.82 MG/DL (ref 0.6–1.1)
ERYTHROCYTE [DISTWIDTH] IN BLOOD BY AUTOMATED COUNT: 13.2 % (ref 11.9–14.6)
FERRITIN SERPL-MCNC: 49 NG/ML (ref 8–388)
GLOBULIN SER CALC-MCNC: 3 G/DL (ref 2.3–3.5)
GLUCOSE SERPL-MCNC: 117 MG/DL (ref 70–99)
HCT VFR BLD AUTO: 39.6 % (ref 35.8–46.3)
HGB BLD-MCNC: 13.2 G/DL (ref 11.7–15.4)
IRON SATN MFR SERPL: 10 % (ref 20–50)
IRON SERPL-MCNC: 31 UG/DL (ref 35–100)
MAGNESIUM SERPL-MCNC: 2.1 MG/DL (ref 1.8–2.4)
MCH RBC QN AUTO: 27.9 PG (ref 26.1–32.9)
MCHC RBC AUTO-ENTMCNC: 33.3 G/DL (ref 31.4–35)
MCV RBC AUTO: 83.7 FL (ref 82–102)
NRBC # BLD: 0 K/UL (ref 0–0.2)
PLATELET # BLD AUTO: 312 K/UL (ref 150–450)
PMV BLD AUTO: 11.6 FL (ref 9.4–12.3)
POTASSIUM SERPL-SCNC: 3.6 MMOL/L (ref 3.5–5.1)
PROT SERPL-MCNC: 6.5 G/DL (ref 6.3–8.2)
RBC # BLD AUTO: 4.73 M/UL (ref 4.05–5.2)
SODIUM SERPL-SCNC: 138 MMOL/L (ref 136–145)
TIBC SERPL-MCNC: 314 UG/DL (ref 240–450)
UIBC SERPL-MCNC: 283 UG/DL (ref 112–347)
WBC # BLD AUTO: 8.7 K/UL (ref 4.3–11.1)

## 2025-07-28 PROCEDURE — 99214 OFFICE O/P EST MOD 30 MIN: CPT | Performed by: NURSE PRACTITIONER

## 2025-07-28 RX ORDER — PANTOPRAZOLE SODIUM 40 MG/1
40 TABLET, DELAYED RELEASE ORAL DAILY
COMMUNITY
Start: 2025-07-11 | End: 2025-08-25

## 2025-07-28 NOTE — TELEPHONE ENCOUNTER
Care Transitions Initial Follow Up Call    Outreach made within 2 business days of discharge: Yes    Patient: Amber Sanchez Patient : 1990   MRN: [id number]  Reason for Admission: diarrhea  Discharge Date: 25       Spoke with: patient     Discharge department/facility: [facility] Interactive Patient Contact:  Was patient able to fill all prescriptions: Yes  Was patient instructed to bring all medications to the follow-up visit: Yes  Is patient taking all medications as directed in the discharge summary? Yes  Does patient understand their discharge instructions: Yes  Does patient have questions or concerns that need addressed prior to 7-14 day follow up office visit: no    Additional needs identified to be addressed with provider  No needs identified             Scheduled appointment with PCP within 7-14 days    Follow Up  Future Appointments   Date Time Provider Department Center   2025  1:40 PM Verito Brown APRN - CNP [facility] DEP   2025  1:00 PM Tasha Jackson APRN - CNP [facility] AMB   2025 11:00 AM Emilia Toth DO [facility] DEP       SAM NOLASCO MA

## 2025-07-28 NOTE — PROGRESS NOTES
Amber Sanchez (:  1990) is a 34 y.o. female,Established patient, here for evaluation of the following chief complaint(s):  Follow-Up from Hospital         Assessment & Plan  Nausea vomiting and diarrhea   Still nausea, still diarrhea, though immodium did help           Lower abdominal pain   F/u w/ gastro  Reviewed CT and colonoscopy results         Diarrhea, unspecified type   Took 1 dose of immodium and then no BM x 2 days  Discussed can take immodium every other day  F/u with gastro         Hypokalemia   Recheck K and Mag    Orders:    Comprehensive Metabolic Panel; Future    Iron and TIBC; Future    Ferritin; Future    CBC; Future    Magnesium; Future    Anemia, unspecified type   Recheck iron and cbc    Orders:    Comprehensive Metabolic Panel; Future    Iron and TIBC; Future    Ferritin; Future    CBC; Future    Magnesium; Future      No follow-ups on file.     Wt Readings from Last 3 Encounters:   25 88.9 kg (196 lb)   25 89.1 kg (196 lb 8 oz)   07/15/25 90.7 kg (200 lb)       Subjective   Patient is here for follow up. She was admitted x 4 days with diarrhea.   She has nasuea and diarrhea. She is eating 1x a day and hydrating well.   She has lost weight. She had diarrhea 19 days prior to hospital visit  Wt Readings from Last 3 Encounters:  25 : 88.9 kg (196 lb)  25 : 89.1 kg (196 lb 8 oz)  07/15/25 : 90.7 kg (200 lb)    BP Readings from Last 3 Encounters:  25 : 130/80  25 : 117/70  07/15/25 : (!) 109/56    She was prescribed immodium at d/c and then had constipation x 2 days after taking, then diarrhea again          Review of Systems       Objective   Physical Exam  Vitals reviewed.   Constitutional:       Appearance: Normal appearance. She is not ill-appearing.   HENT:      Head: Normocephalic.   Eyes:      Pupils: Pupils are equal, round, and reactive to light.   Cardiovascular:      Rate and Rhythm: Normal rate and regular rhythm.   Pulmonary:

## 2025-07-29 ENCOUNTER — CLINICAL DOCUMENTATION (OUTPATIENT)
Dept: GASTROENTEROLOGY | Age: 35
End: 2025-07-29

## 2025-07-29 DIAGNOSIS — R10.30 LOWER ABDOMINAL PAIN: ICD-10-CM

## 2025-07-29 DIAGNOSIS — R19.7 DIARRHEA, UNSPECIFIED TYPE: ICD-10-CM

## 2025-07-29 DIAGNOSIS — R11.2 NAUSEA AND VOMITING, UNSPECIFIED VOMITING TYPE: Primary | ICD-10-CM

## 2025-07-29 RX ORDER — AMITRIPTYLINE HYDROCHLORIDE 10 MG/1
25 TABLET ORAL NIGHTLY
Qty: 75 TABLET | Refills: 0 | Status: SHIPPED | OUTPATIENT
Start: 2025-07-29

## 2025-07-29 RX ORDER — COLESTIPOL HYDROCHLORIDE 1 G/1
1 TABLET ORAL 2 TIMES DAILY
Qty: 120 TABLET | Refills: 3 | Status: SHIPPED | OUTPATIENT
Start: 2025-07-29

## 2025-07-29 NOTE — PROGRESS NOTES
Patient still having nausea and vomiting along with diarrhea. Was recently admitted for this as well. Will get NM gastric emptying study and send in colestipol as well as amitriptyline to see if this helps. Will schedule follow up visit as well.     Tasha Jackson, APRN - CNP

## 2025-07-30 ENCOUNTER — TELEPHONE (OUTPATIENT)
Dept: GASTROENTEROLOGY | Age: 35
End: 2025-07-30

## 2025-08-01 ENCOUNTER — TELEPHONE (OUTPATIENT)
Dept: GASTROENTEROLOGY | Age: 35
End: 2025-08-01

## 2025-08-04 ENCOUNTER — HOSPITAL ENCOUNTER (OUTPATIENT)
Dept: NUCLEAR MEDICINE | Age: 35
Discharge: HOME | End: 2025-08-07
Payer: COMMERCIAL

## 2025-08-04 DIAGNOSIS — R11.2 NAUSEA AND VOMITING, UNSPECIFIED VOMITING TYPE: ICD-10-CM

## 2025-08-04 DIAGNOSIS — R10.30 LOWER ABDOMINAL PAIN: ICD-10-CM

## 2025-08-04 PROCEDURE — 3430000000 HC RX DIAGNOSTIC RADIOPHARMACEUTICAL

## 2025-08-04 PROCEDURE — A9540 TC99M MAA: HCPCS

## 2025-08-04 PROCEDURE — 78264 GASTRIC EMPTYING IMG STUDY: CPT

## 2025-08-04 RX ORDER — METOCLOPRAMIDE 5 MG/1
5 TABLET ORAL 4 TIMES DAILY
Qty: 120 TABLET | Refills: 3 | Status: SHIPPED | OUTPATIENT
Start: 2025-08-04 | End: 2025-08-04

## 2025-08-04 RX ORDER — METOCLOPRAMIDE 5 MG/1
5 TABLET ORAL 4 TIMES DAILY
Qty: 360 TABLET | Refills: 0 | Status: SHIPPED | OUTPATIENT
Start: 2025-08-04

## 2025-08-04 RX ADMIN — KIT FOR THE PREPARATION OF TECHNETIUM TC 99M ALBUMIN AGGREGATED 1 MILLICURIE: 2.5 INJECTION, POWDER, FOR SOLUTION INTRAVENOUS at 08:48

## 2025-08-12 ENCOUNTER — OFFICE VISIT (OUTPATIENT)
Dept: GASTROENTEROLOGY | Age: 35
End: 2025-08-12
Payer: COMMERCIAL

## 2025-08-12 VITALS
SYSTOLIC BLOOD PRESSURE: 116 MMHG | HEIGHT: 63 IN | WEIGHT: 194 LBS | BODY MASS INDEX: 34.38 KG/M2 | DIASTOLIC BLOOD PRESSURE: 80 MMHG

## 2025-08-12 DIAGNOSIS — K31.84 GASTROPARESIS: Primary | ICD-10-CM

## 2025-08-12 DIAGNOSIS — R11.2 NAUSEA AND VOMITING, UNSPECIFIED VOMITING TYPE: ICD-10-CM

## 2025-08-12 DIAGNOSIS — R19.7 DIARRHEA, UNSPECIFIED TYPE: ICD-10-CM

## 2025-08-12 PROCEDURE — 99213 OFFICE O/P EST LOW 20 MIN: CPT

## 2025-09-02 ENCOUNTER — OFFICE VISIT (OUTPATIENT)
Dept: FAMILY MEDICINE CLINIC | Facility: CLINIC | Age: 35
End: 2025-09-02
Payer: COMMERCIAL

## 2025-09-02 VITALS
HEIGHT: 63 IN | DIASTOLIC BLOOD PRESSURE: 84 MMHG | SYSTOLIC BLOOD PRESSURE: 116 MMHG | WEIGHT: 194.4 LBS | HEART RATE: 101 BPM | OXYGEN SATURATION: 98 % | BODY MASS INDEX: 34.45 KG/M2

## 2025-09-02 DIAGNOSIS — J45.50 SEVERE PERSISTENT ASTHMA, UNSPECIFIED WHETHER COMPLICATED (HCC): ICD-10-CM

## 2025-09-02 DIAGNOSIS — F41.9 ANXIETY AND DEPRESSION: ICD-10-CM

## 2025-09-02 DIAGNOSIS — K31.84 GASTROPARESIS: Primary | ICD-10-CM

## 2025-09-02 DIAGNOSIS — E80.21 ACUTE INTERMITTENT PORPHYRIA (HCC): ICD-10-CM

## 2025-09-02 DIAGNOSIS — Z87.42 HISTORY OF ENDOMETRIOSIS: ICD-10-CM

## 2025-09-02 DIAGNOSIS — F32.A ANXIETY AND DEPRESSION: ICD-10-CM

## 2025-09-02 PROCEDURE — 99214 OFFICE O/P EST MOD 30 MIN: CPT | Performed by: FAMILY MEDICINE

## 2025-09-02 RX ORDER — SERTRALINE HYDROCHLORIDE 25 MG/1
25 TABLET, FILM COATED ORAL DAILY
Qty: 30 TABLET | Refills: 0 | Status: SHIPPED | OUTPATIENT
Start: 2025-09-02

## 2025-09-02 RX ORDER — ALBUTEROL SULFATE 90 UG/1
2 INHALANT RESPIRATORY (INHALATION) 4 TIMES DAILY PRN
Qty: 18 G | Refills: 5 | Status: SHIPPED | OUTPATIENT
Start: 2025-09-02

## 2025-09-02 RX ORDER — FLUTICASONE PROPIONATE AND SALMETEROL 500; 50 UG/1; UG/1
1 POWDER RESPIRATORY (INHALATION) EVERY 12 HOURS
Qty: 1 EACH | Refills: 11 | Status: SHIPPED | OUTPATIENT
Start: 2025-09-02

## 2025-09-02 ASSESSMENT — ENCOUNTER SYMPTOMS
ABDOMINAL PAIN: 0
COUGH: 0
NAUSEA: 0
VOMITING: 0
DIARRHEA: 0
SHORTNESS OF BREATH: 0
CONSTIPATION: 0

## (undated) DEVICE — FORCEPS BX L240CM JAW DIA2.8MM L CAP W/ NDL MIC MESH TOOTH

## (undated) DEVICE — SYRINGE MED 10ML LUERLOCK TIP W/O SFTY DISP

## (undated) DEVICE — BRUSH SCRB PCMX NL CLN 12ML --

## (undated) DEVICE — BLOCK BITE AD 60FR W/ VELC STRP ADDRESSES MOST PT AND

## (undated) DEVICE — SINGLE PORT MANIFOLD: Brand: NEPTUNE 2

## (undated) DEVICE — GAUZE,SPONGE,4"X4",12PLY,WOVEN,NS,LF: Brand: MEDLINE

## (undated) DEVICE — CONTAINER FORMALIN PREFILLED 10% NBF 60ML

## (undated) DEVICE — TRAY CATH 16F DRN BG LTX -- CONVERT TO ITEM 363158

## (undated) DEVICE — CONNECTOR TBNG OD5-7MM O2 END DISP

## (undated) DEVICE — UNIVERSAL FIXATION CANNULA: Brand: VERSAONE

## (undated) DEVICE — BLADELESS OPTICAL TROCAR WITH FIXATION CANNULA: Brand: VERSAPORT

## (undated) DEVICE — Device

## (undated) DEVICE — FORCEPS BX 240CM 2.4MM L NDL RAD JAW 4 M00513334

## (undated) DEVICE — [HIGH FLOW INSUFFLATOR,  DO NOT USE IF PACKAGE IS DAMAGED,  KEEP DRY,  KEEP AWAY FROM SUNLIGHT,  PROTECT FROM HEAT AND RADIOACTIVE SOURCES.]: Brand: PNEUMOSURE

## (undated) DEVICE — SYRINGE MEDICAL 3ML CLEAR PLASTIC STANDARD NON CONTROL LUERLOCK TIP DISPOSABLE

## (undated) DEVICE — FORCEPS ES L45CM DIA5MM SERR HLLW JAW CRD CUT DISP PKS

## (undated) DEVICE — BLADELESS OPTICAL TROCAR WITH FIXATION CANNULA: Brand: VERSAONE

## (undated) DEVICE — LUBE JELLY FOIL PACK 1.4 OZ: Brand: MEDLINE INDUSTRIES, INC.

## (undated) DEVICE — SOL ANTI-FOG 6ML MEDC -- MEDICHOICE - CONVERT TO 358427

## (undated) DEVICE — SOLUTION IV 1000ML 0.9% SOD CHL

## (undated) DEVICE — REM POLYHESIVE ADULT PATIENT RETURN ELECTRODE: Brand: VALLEYLAB

## (undated) DEVICE — 2000CC GUARDIAN II: Brand: GUARDIAN

## (undated) DEVICE — DRAPE,TOP,102X53,STERILE: Brand: MEDLINE

## (undated) DEVICE — KENDALL SCD EXPRESS SLEEVES, KNEE LENGTH, MEDIUM: Brand: KENDALL SCD

## (undated) DEVICE — NEEDLE SYRINGE 18GA L1.5IN RED PLAS HUB S STL BLNT FILL W/O

## (undated) DEVICE — SUTURE COAT VCRL SZ 4-0 L18IN ABSRB UD L19MM PS-2 1/2 CIR J496G

## (undated) DEVICE — SOLUTION IRRIG 1000ML H2O PIC PLAS SHATTERPROOF CONTAINER

## (undated) DEVICE — ENDOSCOPIC KIT 1.1+ OP4 CA DE 2 GWN AAMI LEVEL 3

## (undated) DEVICE — YANKAUER,BULB TIP,W/O VENT,RIGID,STERILE: Brand: MEDLINE

## (undated) DEVICE — SYRINGE, LUER SLIP, STERILE, 60ML: Brand: MEDLINE

## (undated) DEVICE — KENDALL RADIOLUCENT FOAM MONITORING ELECTRODE RECTANGULAR SHAPE: Brand: KENDALL

## (undated) DEVICE — SPECIMEN RETRIEVAL POUCH: Brand: ENDO CATCH GOLD

## (undated) DEVICE — PERI-PAD,MODERATE: Brand: CURITY

## (undated) DEVICE — INSUFFLATION NEEDLE: Brand: SURGINEEDLE

## (undated) DEVICE — AIRLIFE™ OXYGEN TUBING 7 FEET (2.1 M) CRUSH RESISTANT OXYGEN TUBING, VINYL TIPPED: Brand: AIRLIFE™

## (undated) DEVICE — DISPOSABLE BIOPSY VALVE MAJ-1555: Brand: SINGLE USE BIOPSY VALVE (STERILE)

## (undated) DEVICE — SUTURE SZ 0 27IN 5/8 CIR UR-6  TAPER PT VIOLET ABSRB VICRYL J603H

## (undated) DEVICE — TRAY PREP DRY W/ PREM GLV 2 APPL 6 SPNG 2 UNDPD 1 OVERWRAP